# Patient Record
Sex: FEMALE | Employment: UNEMPLOYED | URBAN - METROPOLITAN AREA
[De-identification: names, ages, dates, MRNs, and addresses within clinical notes are randomized per-mention and may not be internally consistent; named-entity substitution may affect disease eponyms.]

---

## 2017-04-21 ENCOUNTER — HOSPITAL ENCOUNTER (OUTPATIENT)
Dept: GENERAL RADIOLOGY | Age: 52
Discharge: HOME | End: 2017-04-21
Payer: MEDICARE

## 2017-04-21 DIAGNOSIS — R05.9 COUGH: ICD-10-CM

## 2017-04-21 PROCEDURE — 71020 XR CHEST PA LAT: CPT

## 2017-05-19 ENCOUNTER — HOSPITAL ENCOUNTER (OUTPATIENT)
Dept: NON INVASIVE DIAGNOSTICS | Age: 52
Discharge: HOME | End: 2017-05-19
Attending: FAMILY MEDICINE
Payer: MEDICARE

## 2017-05-19 DIAGNOSIS — R60.9 EDEMA: ICD-10-CM

## 2017-05-19 PROCEDURE — 93306 TTE W/DOPPLER COMPLETE: CPT

## 2018-02-14 ENCOUNTER — HOSPITAL ENCOUNTER (INPATIENT)
Age: 53
LOS: 5 days | Discharge: HOME HEALTH | DRG: 206 | End: 2018-02-19
Attending: EMERGENCY MEDICINE | Admitting: FAMILY MEDICINE
Payer: MEDICARE

## 2018-02-14 ENCOUNTER — APPOINTMENT (OUTPATIENT)
Dept: GENERAL RADIOLOGY | Age: 53
DRG: 206 | End: 2018-02-14
Attending: EMERGENCY MEDICINE
Payer: MEDICARE

## 2018-02-14 ENCOUNTER — APPOINTMENT (OUTPATIENT)
Dept: CT IMAGING | Age: 53
DRG: 206 | End: 2018-02-14
Attending: FAMILY MEDICINE
Payer: MEDICARE

## 2018-02-14 DIAGNOSIS — R29.898 WEAKNESS OF BOTH LEGS: ICD-10-CM

## 2018-02-14 DIAGNOSIS — R06.02 SHORTNESS OF BREATH: Primary | ICD-10-CM

## 2018-02-14 DIAGNOSIS — Q75.0 CRANIOSTENOSIS: ICD-10-CM

## 2018-02-14 DIAGNOSIS — R60.9 PERIPHERAL EDEMA: ICD-10-CM

## 2018-02-14 LAB
ALBUMIN SERPL-MCNC: 3.5 G/DL (ref 3.4–5)
ALBUMIN SERPL-MCNC: 3.6 G/DL (ref 3.4–5)
ALBUMIN/GLOB SERPL: 1 {RATIO} (ref 0.8–1.7)
ALBUMIN/GLOB SERPL: 1.1 {RATIO} (ref 0.8–1.7)
ALP SERPL-CCNC: 138 U/L (ref 45–117)
ALP SERPL-CCNC: 138 U/L (ref 45–117)
ALT SERPL-CCNC: 38 U/L (ref 13–56)
ALT SERPL-CCNC: 39 U/L (ref 13–56)
ANION GAP SERPL CALC-SCNC: 8 MMOL/L (ref 3–18)
AST SERPL-CCNC: 24 U/L (ref 15–37)
AST SERPL-CCNC: 28 U/L (ref 15–37)
ATRIAL RATE: 87 BPM
BASOPHILS # BLD: 0 K/UL (ref 0–0.06)
BASOPHILS NFR BLD: 1 % (ref 0–2)
BILIRUB DIRECT SERPL-MCNC: 0.2 MG/DL (ref 0–0.2)
BILIRUB SERPL-MCNC: 0.5 MG/DL (ref 0.2–1)
BILIRUB SERPL-MCNC: 0.5 MG/DL (ref 0.2–1)
BNP SERPL-MCNC: 31 PG/ML (ref 0–900)
BUN SERPL-MCNC: 13 MG/DL (ref 7–18)
BUN/CREAT SERPL: 24 (ref 12–20)
CALCIUM SERPL-MCNC: 8.7 MG/DL (ref 8.5–10.1)
CALCULATED P AXIS, ECG09: 23 DEGREES
CALCULATED R AXIS, ECG10: 130 DEGREES
CALCULATED T AXIS, ECG11: 32 DEGREES
CHLORIDE SERPL-SCNC: 107 MMOL/L (ref 100–108)
CO2 SERPL-SCNC: 30 MMOL/L (ref 21–32)
CREAT SERPL-MCNC: 0.55 MG/DL (ref 0.6–1.3)
DIAGNOSIS, 93000: NORMAL
DIFFERENTIAL METHOD BLD: ABNORMAL
EOSINOPHIL # BLD: 0.2 K/UL (ref 0–0.4)
EOSINOPHIL NFR BLD: 5 % (ref 0–5)
ERYTHROCYTE [DISTWIDTH] IN BLOOD BY AUTOMATED COUNT: 14.1 % (ref 11.6–14.5)
GLOBULIN SER CALC-MCNC: 3.4 G/DL (ref 2–4)
GLOBULIN SER CALC-MCNC: 3.4 G/DL (ref 2–4)
GLUCOSE SERPL-MCNC: 93 MG/DL (ref 74–99)
HCT VFR BLD AUTO: 41.9 % (ref 35–45)
HGB BLD-MCNC: 13.7 G/DL (ref 12–16)
LYMPHOCYTES # BLD: 1.3 K/UL (ref 0.9–3.6)
LYMPHOCYTES NFR BLD: 31 % (ref 21–52)
MCH RBC QN AUTO: 27.9 PG (ref 24–34)
MCHC RBC AUTO-ENTMCNC: 32.7 G/DL (ref 31–37)
MCV RBC AUTO: 85.3 FL (ref 74–97)
MONOCYTES # BLD: 0.3 K/UL (ref 0.05–1.2)
MONOCYTES NFR BLD: 6 % (ref 3–10)
NEUTS SEG # BLD: 2.4 K/UL (ref 1.8–8)
NEUTS SEG NFR BLD: 57 % (ref 40–73)
P-R INTERVAL, ECG05: 142 MS
PLATELET # BLD AUTO: 190 K/UL (ref 135–420)
PMV BLD AUTO: 9.8 FL (ref 9.2–11.8)
POTASSIUM SERPL-SCNC: 3.9 MMOL/L (ref 3.5–5.5)
PROT SERPL-MCNC: 6.9 G/DL (ref 6.4–8.2)
PROT SERPL-MCNC: 7 G/DL (ref 6.4–8.2)
Q-T INTERVAL, ECG07: 378 MS
QRS DURATION, ECG06: 78 MS
QTC CALCULATION (BEZET), ECG08: 454 MS
RBC # BLD AUTO: 4.91 M/UL (ref 4.2–5.3)
SODIUM SERPL-SCNC: 145 MMOL/L (ref 136–145)
TSH SERPL DL<=0.05 MIU/L-ACNC: 0.75 UIU/ML (ref 0.36–3.74)
VENTRICULAR RATE, ECG03: 87 BPM
WBC # BLD AUTO: 4.2 K/UL (ref 4.6–13.2)

## 2018-02-14 PROCEDURE — 80076 HEPATIC FUNCTION PANEL: CPT | Performed by: EMERGENCY MEDICINE

## 2018-02-14 PROCEDURE — 65270000029 HC RM PRIVATE

## 2018-02-14 PROCEDURE — 85025 COMPLETE CBC W/AUTO DIFF WBC: CPT | Performed by: EMERGENCY MEDICINE

## 2018-02-14 PROCEDURE — 99284 EMERGENCY DEPT VISIT MOD MDM: CPT

## 2018-02-14 PROCEDURE — 71045 X-RAY EXAM CHEST 1 VIEW: CPT

## 2018-02-14 PROCEDURE — 93005 ELECTROCARDIOGRAM TRACING: CPT

## 2018-02-14 PROCEDURE — 83880 ASSAY OF NATRIURETIC PEPTIDE: CPT | Performed by: EMERGENCY MEDICINE

## 2018-02-14 PROCEDURE — 80053 COMPREHEN METABOLIC PANEL: CPT | Performed by: EMERGENCY MEDICINE

## 2018-02-14 PROCEDURE — 84443 ASSAY THYROID STIM HORMONE: CPT | Performed by: EMERGENCY MEDICINE

## 2018-02-14 PROCEDURE — 70450 CT HEAD/BRAIN W/O DYE: CPT

## 2018-02-14 NOTE — ED PROVIDER NOTES
EMERGENCY DEPARTMENT HISTORY AND PHYSICAL EXAM    11:42 AM      Date: 2/14/2018  Patient Name: Conrad Hollins    History of Presenting Illness     Chief Complaint   Patient presents with    Shortness of Breath         History Provided By: Caregiver    Chief Complaint: Shortness of breath  Duration:  Weeks  Timing:  Gradual and Worsening  Location: N/A  Quality: N/A  Severity: Moderate  Modifying Factors: Made worse at night. Associated Symptoms: cough      Additional History (Context): Conrad Hollins is a 50 y.o. female with a history of encephalocele and cerebral palsy s/p shunting, who presents to the ED via EMS with complaint of intermittent shortness of breath which has been ongoing for the past month. Patient's caregiver is the primary historian due to developmental delay. He notes that patient has been complaining of shortness of breath for \"a while now\" and has been seen by her PCP, Dr. Joslyn Hill. Patient has also had a cough, but has been unable to bring up any secretions. Caregiver denies any signs of fever, rhinorrhea, nausea, vomiting, diarrhea, or abdominal pain. He notes that she sometimes experiences constipation. Patient has no history of asthma or heart problems. She currently takes Lasix. Caregiver notes that Dr. Joslyn Hill referred patient to the ED this morning for further workup. History and ROS are limited due to developmental delay.     PCP: Ulises Dooley MD        Past History     Past Medical History:  Past Medical History:   Diagnosis Date    Cerebral palsy ([address]     Hematoma     left tibia    Osteoarthritis of right knee     Osteoporosis     diffuse    Pressure ulcer of heel     right    Scoliosis     thoracolumar    Sprain     right lower leg and ankle       Past Surgical History:  Past Surgical History:   Procedure Laterality Date    HX ORTHOPAEDIC      leg surgery x 2    HX OTHER SURGICAL      shunts inserted in left and right side of head       Family History:  Family History Problem Relation Age of Onset    Diabetes Other      NOS       Social History:  Social History   Substance Use Topics    Smoking status: Not on file    Smokeless tobacco: Not on file    Alcohol use No       Allergies: Allergies   Allergen Reactions    Penicillins Not Reported This Time         Review of Systems       Review of Systems   Unable to perform ROS: Other (Developmental delay. History provided by caregiver.)   Constitutional: Negative for activity change, appetite change, chills, diaphoresis, fatigue, fever and unexpected weight change. HENT: Negative for congestion, dental problem, drooling, ear discharge, ear pain, facial swelling, hearing loss, mouth sores, nosebleeds, postnasal drip, rhinorrhea, sinus pressure, sneezing, sore throat, tinnitus and trouble swallowing. Eyes: Negative for photophobia, pain, discharge, redness, itching and visual disturbance. Respiratory: Positive for cough and shortness of breath. Negative for apnea, choking, chest tightness, wheezing and stridor. Cardiovascular: Positive for leg swelling. Negative for chest pain and palpitations. Gastrointestinal: Negative for abdominal distention, abdominal pain, anal bleeding, blood in stool, constipation, diarrhea, nausea, rectal pain and vomiting. Endocrine: Negative for cold intolerance, heat intolerance, polydipsia, polyphagia and polyuria. Genitourinary: Negative for decreased urine volume, difficulty urinating, dysuria, enuresis, flank pain, frequency, genital sores, hematuria and urgency. Musculoskeletal: Negative for arthralgias, back pain, gait problem, joint swelling, myalgias, neck pain and neck stiffness. Skin: Negative for color change, pallor, rash and wound. Allergic/Immunologic: Negative for environmental allergies, food allergies and immunocompromised state.    Neurological: Negative for dizziness, tremors, seizures, syncope, facial asymmetry, speech difficulty, weakness, light-headedness, numbness and headaches. Hematological: Negative for adenopathy. Does not bruise/bleed easily. Psychiatric/Behavioral: Negative for agitation, behavioral problems, confusion, decreased concentration, dysphoric mood, hallucinations, self-injury, sleep disturbance and suicidal ideas. The patient is not nervous/anxious and is not hyperactive. Physical Exam     Visit Vitals    /78    Pulse (!) 103    Temp 97.9 °F (36.6 °C)    Resp 18    Ht 5' 4\" (1.626 m)    Wt 127.2 kg (280 lb 8 oz)    SpO2 95%    Breastfeeding No    BMI 48.15 kg/m2         Physical Exam   Constitutional: She appears well-developed and well-nourished. Alert and appropriate in no apparent marked discomfort or acute respiratory distress   HENT:   Head: Normocephalic and atraumatic. Right Ear: External ear normal.   Left Ear: External ear normal.   Mouth/Throat: Oropharynx is clear and moist. No oropharyngeal exudate. Eyes: Conjunctivae and EOM are normal. Pupils are equal, round, and reactive to light. Right eye exhibits no discharge. Left eye exhibits no discharge. No scleral icterus. Neck: Normal range of motion. No tracheal deviation present. No thyromegaly present. Cardiovascular: Normal rate, regular rhythm, normal heart sounds and intact distal pulses. Exam reveals no gallop and no friction rub. No murmur heard. Pulmonary/Chest: Effort normal and breath sounds normal. No respiratory distress. She has no wheezes. She has no rales. She exhibits no tenderness. Abdominal: Soft. Bowel sounds are normal. She exhibits no distension. There is no tenderness. There is no rebound and no guarding. Musculoskeletal: Normal range of motion. She exhibits edema. She exhibits no tenderness. Lymphadenopathy:     She has no cervical adenopathy. Neurological: She is alert. No cranial nerve deficit. Coordination normal.   Baseline developmental delay without new changes according to father   Skin: Skin is warm.  No erythema. Psychiatric: She has a normal mood and affect. Her behavior is normal. Judgment and thought content normal.   Nursing note and vitals reviewed. Diagnostic Study Results     Labs -  Recent Results (from the past 12 hour(s))   EKG, 12 LEAD, INITIAL    Collection Time: 02/14/18  1:36 PM   Result Value Ref Range    Ventricular Rate 87 BPM    Atrial Rate 87 BPM    P-R Interval 142 ms    QRS Duration 78 ms    Q-T Interval 378 ms    QTC Calculation (Bezet) 454 ms    Calculated P Axis 23 degrees    Calculated R Axis 130 degrees    Calculated T Axis 32 degrees    Diagnosis       Normal sinus rhythm  Possible Right ventricular hypertrophy  Nonspecific ST and T wave abnormality  Abnormal ECG  No previous ECGs available  Confirmed by Narayan Jeffries MD, --- ([id number]) on 2/14/2018 3:33:48 PM     CBC WITH AUTOMATED DIFF    Collection Time: 02/14/18  1:53 PM   Result Value Ref Range    WBC 4.2 (L) 4.6 - 13.2 K/uL    RBC 4.91 4.20 - 5.30 M/uL    HGB 13.7 12.0 - 16.0 g/dL    HCT 41.9 35.0 - 45.0 %    MCV 85.3 74.0 - 97.0 FL    MCH 27.9 24.0 - 34.0 PG    MCHC 32.7 31.0 - 37.0 g/dL    RDW 14.1 11.6 - 14.5 %    PLATELET 474 165 - 360 K/uL    MPV 9.8 9.2 - 11.8 FL    NEUTROPHILS 57 40 - 73 %    LYMPHOCYTES 31 21 - 52 %    MONOCYTES 6 3 - 10 %    EOSINOPHILS 5 0 - 5 %    BASOPHILS 1 0 - 2 %    ABS. NEUTROPHILS 2.4 1.8 - 8.0 K/UL    ABS. LYMPHOCYTES 1.3 0.9 - 3.6 K/UL    ABS. MONOCYTES 0.3 0.05 - 1.2 K/UL    ABS. EOSINOPHILS 0.2 0.0 - 0.4 K/UL    ABS.  BASOPHILS 0.0 0.0 - 0.06 K/UL    DF AUTOMATED     METABOLIC PANEL, COMPREHENSIVE    Collection Time: 02/14/18  3:47 PM   Result Value Ref Range    Sodium 145 136 - 145 mmol/L    Potassium 3.9 3.5 - 5.5 mmol/L    Chloride 107 100 - 108 mmol/L    CO2 30 21 - 32 mmol/L    Anion gap 8 3.0 - 18 mmol/L    Glucose 93 74 - 99 mg/dL    BUN 13 7.0 - 18 MG/DL    Creatinine 0.55 (L) 0.6 - 1.3 MG/DL    BUN/Creatinine ratio 24 (H) 12 - 20      GFR est AA >60 >60 ml/min/1.73m2    GFR est non-AA >60 >60 ml/min/1.73m2    Calcium 8.7 8.5 - 10.1 MG/DL    Bilirubin, total 0.5 0.2 - 1.0 MG/DL    ALT (SGPT) 39 13 - 56 U/L    AST (SGOT) 24 15 - 37 U/L    Alk. phosphatase 138 (H) 45 - 117 U/L    Protein, total 6.9 6.4 - 8.2 g/dL    Albumin 3.5 3.4 - 5.0 g/dL    Globulin 3.4 2.0 - 4.0 g/dL    A-G Ratio 1.0 0.8 - 1.7     NT-PRO BNP    Collection Time: 02/14/18  3:47 PM   Result Value Ref Range    NT pro-BNP 31 0 - 900 PG/ML       Radiologic Studies -   DUPLEX LOWER EXT VENOUS BILAT   Final Result      CT HEAD WO CONT   Final Result      XR CHEST PORT   Final Result      ER physician interpretation of Chest X-ray:    Loss of lung volume versus expiratory film. Increased interstitial markings. 12:28 PM       Medical Decision Making   I am the first provider for this patient. I reviewed the vital signs, available nursing notes, past medical history, past surgical history, family history and social history. Vital Signs-Reviewed the patient's vital signs. EKG: Interpreted by the EP. Time Interpreted: 1328   Rate: 87   Rhythm: Normal Sinus Rhythm t-wave flattening in anterior/septal leads,    Interpretation: Abnormal    Records Reviewed: Nursing Notes and Old Medical Records (Time of Review: 11:42 AM)    ED Course: Progress Notes, Reevaluation, and Consults:  Consult:  Discussed care with Dr. Brayan Jacobson. Standard discussion; including history of patients chief complaint, available diagnostic results, and treatment course. Will admit patient for further work-up of possible underlying structural heart disease or progression of neurologic disease in a patient no longer able to ambulate with SOB and peripheral edema. 5:48 PM, 2/14/2018      Provider Notes (Medical Decision Making): Patient with increasing shortness of breath and peripheral edema and associated weakness. Will evaluate for possible anemia, electrolyte abnormality, renal or hepatic failure and CHF.  Will likely require admission due to progression of illness with increasing weakness. For Hospitalized Patients:    Hospitalization Decision Time:  The decision to hospitalize the patient was made by Dr. Ghazal Ovalles at 5:48 PM on 2/14/2018    Diagnosis     Clinical Impression:   1. Shortness of breath    2. Weakness of both legs    3. Craniostenosis    4. Peripheral edema        Disposition: Admission    Follow-up Information     Follow up With Details Comments MD Stone On 2/26/2018 @12:00PM [address] [place] [address]  [phone]             Discharge Medication List as of 2/19/2018  3:44 PM      CONTINUE these medications which have NOT CHANGED    Details   potassium chloride (K-DUR, KLOR-CON) 20 mEq tablet Take 20 mEq by mouth daily. , Historical Med      furosemide (LASIX) 40 mg tablet Take 40 mg by mouth daily. , Historical Med      multivitamin (ONE A DAY) tablet Take 1 Tab by mouth daily. , Historical Med      calcium-vitamin D (OYSTER SHELL) 500 mg(1,250mg) -200 unit per tablet Take 1 Tab by mouth two (2) times daily (with meals). , Historical Med           _______________________________    Scribe Attestation:     Shirley Pollack, acting as a scribe for and in the presence of Damaris Ralph MD      February 14, 2018 at 11:42 AM       Provider Attestation:      I personally performed the services described in the documentation, reviewed the documentation, as recorded by the scribe in my presence, and it accurately and completely records my words and actions.  February 14, 2018 at 11:42 AM - Damaris Ralph MD      _______________________________

## 2018-02-14 NOTE — IP AVS SNAPSHOT
[address] Patient: Gwendolyn Malik MRN: [id number] :1965 A check idalmis indicates which time of day the medication should be taken. My Medications CONTINUE taking these medications Instructions Each Dose to Equal  
 Morning Noon Evening Bedtime  
 calcium-vitamin D 500 mg(1,250mg) -200 unit per tablet Commonly known as:  OYSTER SHELL Your last dose was: Your next dose is: Take 1 Tab by mouth two (2) times daily (with meals). 1 Tab  
    
   
   
   
  
 furosemide 40 mg tablet Commonly known as:  LASIX Your last dose was: Your next dose is: Take 40 mg by mouth daily. 40 mg  
    
   
   
   
  
 multivitamin tablet Commonly known as:  ONE A DAY Your last dose was: Your next dose is: Take 1 Tab by mouth daily. 1 Tab  
    
   
   
   
  
 potassium chloride 20 mEq tablet Commonly known as:  K-DUR, KLOR-CON Your last dose was: Your next dose is: Take 20 mEq by mouth daily. 20 mEq

## 2018-02-14 NOTE — IP AVS SNAPSHOT
[address] [facility] 
 
 
 [address] Patient: Genevieve Singh MRN: [id number] :1965 About your hospitalization You were admitted on:  2018 [facility] [address] You were discharged on:  2018 Why you were hospitalized Your primary diagnosis was:  Not on File Your diagnoses also included:  Shortness Of Breath, Weakness Of Both Legs, Craniostenosis, Peripheral Edema Follow-up Information Follow up With Details Comments Contact Info Coco Lowe MD On 2018 @12:00PM [address] [place], [place] [address]ti [facility] [address] [phone] Discharge Orders None A check idalmis indicates which time of day the medication should be taken. My Medications CONTINUE taking these medications Instructions Each Dose to Equal  
 Morning Noon Evening Bedtime  
 calcium-vitamin D 500 mg(1,250mg) -200 unit per tablet Commonly known as:  OYSTER SHELL Your last dose was: Your next dose is: Take 1 Tab by mouth two (2) times daily (with meals). 1 Tab  
    
   
   
   
  
 furosemide 40 mg tablet Commonly known as:  LASIX Your last dose was: Your next dose is: Take 40 mg by mouth daily. 40 mg  
    
   
   
   
  
 multivitamin tablet Commonly known as:  ONE A DAY Your last dose was: Your next dose is: Take 1 Tab by mouth daily. 1 Tab  
    
   
   
   
  
 potassium chloride 20 mEq tablet Commonly known as:  K-DUR, KLOR-CON Your last dose was: Your next dose is: Take 20 mEq by mouth daily. 20 mEq Discharge Instructions Learning About Craniosynostosis in Newborns What is craniosynostosis?  
 
Craniosynostosis (say \"ipzk-qcy-od-kii-cyml-AOV-corey\") is a problem with the skull. The soft areas between the plates of the baby's skull are called sutures. The sutures usually start to fuse together after a child is 3years of age. With craniosynostosis, one or more of the sutures fuses too soon. This can keep the skull from expanding as the baby grows. In severe cases, it can cause pressure on the brain. This is a congenital condition. This means your baby was born with it. If there's a lot of pressure on your baby's brain, surgery may be needed right away to relieve the pressure. Your child will be asleep during surgery. Your baby may need special care, such as being in the  intensive care unit (NICU). This may be scary for you. But the hospital staff understands this. They will explain what happens and will answer your questions. What can you expect? · You may see tubes and wires attached to your baby. This can be scary to see. But these things help the doctor treat your baby. The tubes supply air, fluid, and medicines to your baby. The wires are attached to machines that help the doctor keep track of your baby's vital signs. These include temperature, blood pressure, breathing rate, and pulse rate. · If your baby has trouble breathing, the doctor may use a ventilator. This machine helps your baby breathe. To do this, the doctor puts a soft tube through your baby's mouth into the windpipe. · The hospital staff will give your baby the nutrition he or she needs. The doctor may feed your baby through a soft tube that goes through the nose and into the stomach. Or the doctor may use an IV that goes through the belly button to do this. · Your baby will be kept comfortable and warm. · It may seem that your baby is getting lots of tests. All of these tests help your doctor keep track of your baby's condition and give the best treatment possible.  
· It's hard to be apart from your baby, especially when you worry about his or her condition. Know that the hospital staff is well prepared to care for babies with this condition. They will do everything they can to help. If you need it, get support from friends and family. Ask the hospital staff about counseling and support. Where can you learn more? Go to http://andrzej-anjali.info/. Enter D373 in the search box to learn more about \"Learning About Craniosynostosis in Newborns. \" Current as of: May 12, 2017 Content Version: 11.4 © 8923-5870 [employer]. Care instructions adapted under license by [employer] (which disclaims liability or warranty for this information). If you have questions about a medical condition or this instruction, always ask your healthcare professional. [address] any warranty or liability for your use of this information. Leg and Ankle Edema: Care Instructions Your Care Instructions Swelling in the legs, ankles, and feet is called edema. It is common after you sit or stand for a while. Long plane flights or car rides often cause swelling in the legs and feet. You may also have swelling if you have to stand for long periods of time at your job. Problems with the veins in the legs (varicose veins) and changes in hormones can also cause swelling. Sometimes the swelling in the ankles and feet is caused by a more serious problem, such as heart failure, infection, blood clots, or liver or kidney disease. Follow-up care is a key part of your treatment and safety. Be sure to make and go to all appointments, and call your doctor if you are having problems. It's also a good idea to know your test results and keep a list of the medicines you take. How can you care for yourself at home? · If your doctor gave you medicine, take it as prescribed. Call your doctor if you think you are having a problem with your medicine. · Whenever you are resting, raise your legs up.  Try to keep the swollen area higher than the level of your heart. · Take breaks from standing or sitting in one position. ¨ Walk around to increase the blood flow in your lower legs. ¨ Move your feet and ankles often while you stand, or tighten and relax your leg muscles. · Wear support stockings. Put them on in the morning, before swelling gets worse. · Eat a balanced diet. Lose weight if you need to. · Limit the amount of salt (sodium) in your diet. Salt holds fluid in the body and may increase swelling. When should you call for help? Call 911 anytime you think you may need emergency care. For example, call if: 
? · You have symptoms of a blood clot in your lung (called a pulmonary embolism). These may include: 
¨ Sudden chest pain. ¨ Trouble breathing. ¨ Coughing up blood. ?Call your doctor now or seek immediate medical care if: 
? · You have signs of a blood clot, such as: 
¨ Pain in your calf, back of the knee, thigh, or groin. ¨ Redness and swelling in your leg or groin. ? · You have symptoms of infection, such as: 
¨ Increased pain, swelling, warmth, or redness. ¨ Red streaks or pus. ¨ A fever. ? Watch closely for changes in your health, and be sure to contact your doctor if: 
? · Your swelling is getting worse. ? · You have new or worsening pain in your legs. ? · You do not get better as expected. Where can you learn more? Go to http://andrzej-anjali.info/. Enter W025 in the search box to learn more about \"Leg and Ankle Edema: Care Instructions. \" Current as of: March 20, 2017 Content Version: 11.4 © 0129-9191 [employer]. Care instructions adapted under license by [employer] (which disclaims liability or warranty for this information). If you have questions about a medical condition or this instruction, always ask your healthcare professional. [address] any warranty or liability for your use of this information. Unresulted Labs-Please follow up with your PCP about these lab tests Order Current Status DUPLEX LOWER EXT VENOUS BILAT Preliminary result Providers Seen During Your Hospitalization Provider Specialty Primary office phone Paulo Chew MD Emergency Medicine [phone] Mal King MD Family Practice [phone] Your Primary Care Physician (PCP) Primary Care Physician Office Phone Office Fax Heather Dove [phone] You are allergic to the following Allergen Reactions Penicillins Not Reported This Time Recent Documentation Height Weight Breastfeeding? BMI Smoking Status 1.626 m 127.2 kg No 48.15 kg/m2 Never Assessed Emergency Contacts Name Discharge Info Relation Home Work Mobile [facility] DISCHARGE CAREGIVER [3] Other Relative [6] [phone] Damián Dougherty DISCHARGE CAREGIVER [3] Brother [24] [phone] Patient Belongings The following personal items are in your possession at time of discharge: 
  Dental Appliances: None  Visual Aid: None      Home Medications: None   Jewelry: None  Clothing: Pants, Shirt, Tie, Undergarments, Socks, Footwear    Other Valuables: None Please provide this summary of care documentation to your next provider. Signatures-by signing, you are acknowledging that this After Visit Summary has been reviewed with you and you have received a copy. Patient Signature:  ____________________________________________________________ Date:  ____________________________________________________________  
  
Emi Curiel Provider Signature:  ____________________________________________________________ Date:  ____________________________________________________________

## 2018-02-14 NOTE — ED TRIAGE NOTES
Patient c/o sob on and off for a few weeks Patient's PCP wanted her to be evaluated for her breathing.  Patient currently have no complaints at this time

## 2018-02-14 NOTE — IP AVS SNAPSHOT
Summary of Care Report The Summary of Care report has been created to help improve care coordination. Users with access to [employer] or [address] (Web-based application) may access additional patient information including the Discharge Summary. If you are not currently a [facility] user and need more information, please call the number listed below in the [place] [address] section and ask to be connected with Medical Records. Facility Information Name Address Phone [address] [place] [address] [address] [facility] [address] [phone] Patient Information Patient Name Sex ARSENIO Birch ([id number]) Female 1965 Discharge Information Admitting Provider Service Area Unit Ranjit Gilbert MD / Bakari Pratt 71 / [phone] Discharge Provider Discharge Date/Time Discharge Disposition Destination (none) (none) (none) (none) Patient Language Language ENGLISH [13] Hospital Problems as of 2018  Reviewed: 2018  7:08 PM by Ranjit Gilbert MD  
  
  
  
 Class Noted - Resolved Last Modified POA Active Problems Shortness of breath  2018 - Present 2018 by Andrea Harris MD Unknown Entered by Andrea Harris MD  
  Weakness of both legs  2018 - Present 2018 by Ranjit Gilbert MD Unknown Entered by Ranjit Gilbert MD  
  Craniostenosis  2018 - Present 2018 by Ranjit Gilbert MD Unknown Entered by Ranjit Gilbert MD  
  Peripheral edema  2018 - Present 2018 by Ranjit Gilbert MD Unknown Entered by Ranjit Gilbert MD  
  
Non-Hospital Problems as of 2018  Reviewed: 2018  7:08 PM by Ranjit Gilbert MD  
  
  
  
 Class Noted - Resolved Last Modified Active Problems Follow-up exam, less than 3 months since previous exam, DEXA scan  Unknown - Present 2/22/2011 by Dionisio Kruger Entered by Dionisio Kruger You are allergic to the following Allergen Reactions Penicillins Not Reported This Time Current Discharge Medication List  
  
ASK your doctor about these medications Dose & Instructions Dispensing Information Comments  
 calcium-vitamin D 500 mg(1,250mg) -200 unit per tablet Commonly known as:  OYSTER SHELL Dose:  1 Tab Take 1 Tab by mouth two (2) times daily (with meals). Refills:  0  
   
 furosemide 40 mg tablet Commonly known as:  LASIX Dose:  40 mg Take 40 mg by mouth daily. Refills:  0  
   
 multivitamin tablet Commonly known as:  ONE A DAY Dose:  1 Tab Take 1 Tab by mouth daily. Refills:  0  
   
 potassium chloride 20 mEq tablet Commonly known as:  K-DUR, KLOR-CON Dose:  20 mEq Take 20 mEq by mouth daily. Refills:  0 Follow-up Information Follow up With Details Comments Contact Info Ulises Dooley MD   [address] [place], [place] [address]ti [facility] [address] 
[phone] Discharge Instructions Learning About Craniosynostosis in Newborns What is craniosynostosis? Craniosynostosis (say \"jhak-tdo-ca-hrv-yosf-NUP-corey\") is a problem with the skull. The soft areas between the plates of the baby's skull are called sutures. The sutures usually start to fuse together after a child is 3years of age. With craniosynostosis, one or more of the sutures fuses too soon. This can keep the skull from expanding as the baby grows. In severe cases, it can cause pressure on the brain. This is a congenital condition. This means your baby was born with it. If there's a lot of pressure on your baby's brain, surgery may be needed right away to relieve the pressure. Your child will be asleep during surgery. Your baby may need special care, such as being in the  intensive care unit (NICU). This may be scary for you. But the hospital staff understands this. They will explain what happens and will answer your questions. What can you expect? · You may see tubes and wires attached to your baby. This can be scary to see. But these things help the doctor treat your baby. The tubes supply air, fluid, and medicines to your baby. The wires are attached to machines that help the doctor keep track of your baby's vital signs. These include temperature, blood pressure, breathing rate, and pulse rate. · If your baby has trouble breathing, the doctor may use a ventilator. This machine helps your baby breathe. To do this, the doctor puts a soft tube through your baby's mouth into the windpipe. · The hospital staff will give your baby the nutrition he or she needs. The doctor may feed your baby through a soft tube that goes through the nose and into the stomach. Or the doctor may use an IV that goes through the belly button to do this. · Your baby will be kept comfortable and warm. · It may seem that your baby is getting lots of tests. All of these tests help your doctor keep track of your baby's condition and give the best treatment possible. · It's hard to be apart from your baby, especially when you worry about his or her condition. Know that the hospital staff is well prepared to care for babies with this condition. They will do everything they can to help. If you need it, get support from friends and family. Ask the hospital staff about counseling and support. Where can you learn more? Go to http://andrzej-anjali.info/. Enter X347 in the search box to learn more about \"Learning About Craniosynostosis in Newborns. \" Current as of: May 12, 2017 Content Version: 11.4 © 4946-7576 Healthwise, Incorporated.  Care instructions adapted under license by [address] (which disclaims liability or warranty for this information). If you have questions about a medical condition or this instruction, always ask your healthcare professional. [address] any warranty or liability for your use of this information. Leg and Ankle Edema: Care Instructions Your Care Instructions Swelling in the legs, ankles, and feet is called edema. It is common after you sit or stand for a while. Long plane flights or car rides often cause swelling in the legs and feet. You may also have swelling if you have to stand for long periods of time at your job. Problems with the veins in the legs (varicose veins) and changes in hormones can also cause swelling. Sometimes the swelling in the ankles and feet is caused by a more serious problem, such as heart failure, infection, blood clots, or liver or kidney disease. Follow-up care is a key part of your treatment and safety. Be sure to make and go to all appointments, and call your doctor if you are having problems. It's also a good idea to know your test results and keep a list of the medicines you take. How can you care for yourself at home? · If your doctor gave you medicine, take it as prescribed. Call your doctor if you think you are having a problem with your medicine. · Whenever you are resting, raise your legs up. Try to keep the swollen area higher than the level of your heart. · Take breaks from standing or sitting in one position. ¨ Walk around to increase the blood flow in your lower legs. ¨ Move your feet and ankles often while you stand, or tighten and relax your leg muscles. · Wear support stockings. Put them on in the morning, before swelling gets worse. · Eat a balanced diet. Lose weight if you need to. · Limit the amount of salt (sodium) in your diet. Salt holds fluid in the body and may increase swelling. When should you call for help? Call 911 anytime you think you may need emergency care. For example, call if: 
? · You have symptoms of a blood clot in your lung (called a pulmonary embolism). These may include: 
¨ Sudden chest pain. ¨ Trouble breathing. ¨ Coughing up blood. ?Call your doctor now or seek immediate medical care if: 
? · You have signs of a blood clot, such as: 
¨ Pain in your calf, back of the knee, thigh, or groin. ¨ Redness and swelling in your leg or groin. ? · You have symptoms of infection, such as: 
¨ Increased pain, swelling, warmth, or redness. ¨ Red streaks or pus. ¨ A fever. ? Watch closely for changes in your health, and be sure to contact your doctor if: 
? · Your swelling is getting worse. ? · You have new or worsening pain in your legs. ? · You do not get better as expected. Where can you learn more? Go to http://andrzej-anjali.info/. Enter K735 in the search box to learn more about \"Leg and Ankle Edema: Care Instructions. \" Current as of: March 20, 2017 Content Version: 11.4 © 6136-0508 [employer] ?. Care instructions adapted under license by [employer] (which disclaims liability or warranty for this information). If you have questions about a medical condition or this instruction, always ask your healthcare professional. [place] [address] any warranty or liability for your use of this information. Chart Review Routing History No Routing History on File

## 2018-02-15 LAB — GLUCOSE BLD STRIP.AUTO-MCNC: 92 MG/DL (ref 70–110)

## 2018-02-15 PROCEDURE — 82962 GLUCOSE BLOOD TEST: CPT

## 2018-02-15 PROCEDURE — 93306 TTE W/DOPPLER COMPLETE: CPT

## 2018-02-15 PROCEDURE — 65270000029 HC RM PRIVATE

## 2018-02-15 RX ORDER — BISMUTH SUBSALICYLATE 262 MG
1 TABLET,CHEWABLE ORAL DAILY
COMMUNITY

## 2018-02-15 RX ORDER — POTASSIUM CHLORIDE 20 MEQ/1
20 TABLET, EXTENDED RELEASE ORAL DAILY
COMMUNITY

## 2018-02-15 RX ORDER — FERROUS SULFATE, DRIED 160(50) MG
1 TABLET, EXTENDED RELEASE ORAL 2 TIMES DAILY WITH MEALS
COMMUNITY

## 2018-02-15 RX ORDER — FUROSEMIDE 40 MG/1
40 TABLET ORAL DAILY
COMMUNITY

## 2018-02-15 NOTE — ED NOTES
Patient resting well call bell within reach. No additional wants or needs noted patient watching TV at this  Time and Family at the bedside.

## 2018-02-15 NOTE — PROGRESS NOTES
Care Management Interventions  PCP Verified by CM: Yes (DR. Husam Silva)  Palliative Care Criteria Met (RRAT>21 & CHF Dx)?: No  Mode of Transport at Discharge: BLS  Transition of Care Consult (CM Consult): Discharge Planning ([address] [facility] [address])  Current Support Network: Relative's Home, Family Lives Nearby, Has Personal Caregivers ([address])  Confirm Follow Up Transport: Other (see comment)  Plan discussed with Pt/Family/Caregiver: Yes (PT'S FATHER IS REQUESTING LONG TERM PLACEMENT, FATHER LATONYA CANNONTSJVL-[phone], DOES NOT FEEL HE CAN TAKE CARE OF PT AT [address] [facility])  Discharge Location  Discharge Placement: [address] (LTAC)  This writer will speak with family regarding long term care facility for this pt instead of bringing this pt home. Pt's family will be provided the listing of facilities for their request for long term care placement.

## 2018-02-15 NOTE — H&P
History and Physical    Patient: Karen Shen MRN: [id number]  SSN: [id number]    YOB: 1965  Age: 50 y.o. Sex: female      Subjective:      Karen Shen is a 50 y.o. female who has craniostenosis but has been having progressive weakness so no longer ambulating. Patient has been having progressive edema and now with SOB. Patient poor historian. CXR hypoinflated. Will admit for evaluation of edema with SOB and weakness. Past Medical History:   Diagnosis Date    Cerebral palsy ([address]     Hematoma     left tibia    Osteoarthritis of right knee     Osteoporosis     diffuse    Pressure ulcer of heel     right    Scoliosis     thoracolumar    Sprain     right lower leg and ankle     Past Surgical History:   Procedure Laterality Date    HX ORTHOPAEDIC      leg surgery x 2    HX OTHER SURGICAL      shunts inserted in left and right side of head      Family History   Problem Relation Age of Onset    Diabetes Other      NOS     Social History   Substance Use Topics    Smoking status: Not on file    Smokeless tobacco: Not on file    Alcohol use No      Prior to Admission medications    Not on File        Allergies   Allergen Reactions    Penicillins Not Reported This Time       Review of Systems:  Review of systems not obtained due to patient factors. Objective:     Vitals:    02/14/18 1210 02/14/18 1326   BP:  (!) 145/100   Pulse: 89    Resp: 18    Temp: 97.6 °F (36.4 °C)    SpO2: 92%         Physical Exam:  GENERAL: alert, cooperative, no distress, slowed mentation, appears stated age  LUNG: clear to auscultation bilaterally  HEART: regular rate and rhythm, S1, S2 normal, no murmur, click, rub or gallop  ABDOMEN: soft, non-tender.  Bowel sounds normal. No masses,  no organomegaly  EXTREMITIES:  edema 2+  NEUROLOGIC: positive findings: muscular weakness both legs   Nystagmus horizontal    Assessment:     Hospital Problems  Date Reviewed: 2/14/2018          Codes Class Noted POA Shortness of breath ICD-10-CM: R06.02  ICD-9-CM: 786.05  2/14/2018 Unknown        Weakness of both legs ICD-10-CM: R29.898  ICD-9-CM: 729.89  2/14/2018 Unknown        Craniostenosis ICD-10-CM: Q75.0  ICD-9-CM: 756.0  2/14/2018 Unknown        Peripheral edema ICD-10-CM: R60.9  ICD-9-CM: 782.3  2/14/2018 Unknown              Plan:     Check ECHO head CT. Further workup as needed.     Signed By: Ulises Dooley MD     February 14, 2018

## 2018-02-15 NOTE — ED NOTES
Patient provided with an update on care call bell within reach. Patient denies pain.  No additional wants or needs noted at this time,

## 2018-02-15 NOTE — ROUTINE PROCESS
TRANSFER - IN REPORT:    Verbal report received from 1740 Shanna Steward RN(name) on Christian Alvarado  being received from ED(unit) for routine progression of care      Report consisted of patients Situation, Background, Assessment and   Recommendations(SBAR). Information from the following report(s) SBAR, Kardex, ED Summary and Recent Results was reviewed with the receiving nurse. Opportunity for questions and clarification was provided. Assessment completed upon patients arrival to unit and care assumed.

## 2018-02-15 NOTE — ED NOTES
Blood drawn and sent to the lab. IV in place and is flowing well. Patient denies pain and shortness of breath.  Family present at the bedside at this time

## 2018-02-15 NOTE — ROUTINE PROCESS
Bedside and Verbal shift change report given to Sallie Chow (oncoming nurse) by Nas Gardner RN (offgoing nurse). Report included the following information SBAR, Kardex, MAR and Recent Results.     SITUATION:  Code Status: Full Code  Reason for Admission: Shortness of breath  Hospital day: 1  Problem List:       Hospital Problems  Date Reviewed: 2/14/2018          Codes Class Noted POA    Shortness of breath ICD-10-CM: R06.02  ICD-9-CM: 786.05  2/14/2018 Unknown        Weakness of both legs ICD-10-CM: R29.898  ICD-9-CM: 729.89  2/14/2018 Unknown        Craniostenosis ICD-10-CM: Q75.0  ICD-9-CM: 756.0  2/14/2018 Unknown        Peripheral edema ICD-10-CM: R60.9  ICD-9-CM: 782.3  2/14/2018 Unknown              BACKGROUND:   Past Medical History:   Past Medical History:   Diagnosis Date    Cerebral palsy ([address]     Hematoma     left tibia    Osteoarthritis of right knee     Osteoporosis     diffuse    Pressure ulcer of heel     right    Scoliosis     thoracolumar    Sprain     right lower leg and ankle      Patient taking anticoagulants no    Patient has a defibrillator: no    History of shots YES for example, flu, pneumonia, tetanus   Isolation History NO for example, MRSA, CDiff    ASSESSMENT:  Changes in Assessment Throughout Shift: none  Significant Changes in 24 hours (for example, RR/code, fall)  Patient has Central Line: no   Patient has Klein Cath: no   Mobility Issues  PT  IV Patency  OR Checklist  Pending Tests    Last Vitals:  Vitals w/ MEWS Score (last day)     Date/Time MEWS Score Pulse Resp Temp BP Level of Consciousness SpO2    02/15/18 0424 2 86 18 97.3 °F (36.3 °C) 100/72 Alert 96 %    02/15/18 0110 1 78 18 97 °F (36.1 °C) 116/86 Alert 97 %    02/14/18 2100 1 95 18 97.4 °F (36.3 °C) 124/88 Alert 94 %    02/14/18 1937 1 87 18 98 °F (36.7 °C) 129/81 Alert 95 %    02/14/18 1326 -- -- -- -- (!)  145/100 -- --    02/14/18 12:10:33 -- 89 18 97.6 °F (36.4 °C) -- Alert 92 %            PAIN    Pain Assessment    Pain Intensity 1: 0 (02/15/18 0415)              Patient Stated Pain Goal: 0  Intervention effective: yes  Time of last intervention: Denied pain Reassessment Completed: yes   Other actions taken for pain: Distraction    Last 3 Weights: There were no vitals filed for this visit. Weight change:     INTAKE/OUPUT    Current Shift:      Last three shifts:      RECOMMENDATIONS AND DISCHARGE PLANNING  Patient needs and requests: Comfort and safety    Pending tests/procedures: labs     Discharge plan for patient: Home with [facility]    Discharge planning Needs or Barriers: none    Estimated Discharge Date: 2/16/2018 Posted on Whiteboard in Patients Room: yes       \"HEALS\" SAFETY CHECK  A safety check occurred in the patient's room between off going nurse and oncoming nurse listed above. The safety check included the below items:    H  High Alert Medications Verify all high alert medication drips (heparin, PCA, etc.)  E  Equipment Suction is set up for ALL patients (with lalito)  Red plugs utilized for all equipment (IV pumps, etc.)  WOWs wiped down at end of shift. Room stocked with oxygen, suction, and other unit-specific supplies  A  Alarms Bed alarm is set for fall risk patients  Ensure chair alarm is in place and activated if patient is up in a chair  L  Lines Check IV for any infiltration  Klein bag is empty if patient has a Klein   Tubing and IV bags are labeled  S  Safety  Room is clean, patient is clean, and equipment is clean. Hallways are clear from equipment besides carts. Fall bracelet on for fall risk patients  Ensure room is clear and free of clutter  Suction is set up for ALL patients (with lalito)  Hallways are clear from equipment besides carts.    Isolation precautions followed, supplies available outside room, sign posted    Dhruv Haile RN

## 2018-02-15 NOTE — PHYSICIAN ADVISORY
Letter of admission status determination     Terence Arnold   Age: 50 y.o. MRN: [id number]  [facility]:  [id number]    Date of admission: 2/14/2018    I have reviewed this case as it involves a Medicare patient admitted as inpatient that does not meet the medical necessity requirements in CMS regulation Section 43 .3 to support an inpatient level of care. Patient's condition and the documented plan of care at the time of presentation, with stable labs, vitals, no need for supplemental oxygen or IV therapies, do not support the need for medically necessary hospitalization that spans at least two midnights. Therefore, unless medically necessary hospitalization for a second midnight is anticipated, I recommend that this patient be downgraded to OBSERVATION status. This may change due to the medical condition of the patient and new clinical evidence as the patients care progreses. The final decision regarding the patient's hospitalization status depends on the attending physician's judgment.       Hector Johnson MD, RODNEY, [address] [facility]  Physician Paulo Verduzco.  [phone]    February 15, 2018   2:14 PM

## 2018-02-15 NOTE — ED NOTES
TRANSFER - OUT REPORT:    Verbal report given to Lucie Guzman (name) on Arielle Manger  being transferred to [phone] (unit) for routine progression of care       Report consisted of patients Situation, Background, Assessment and   Recommendations(SBAR). Information from the following report(s) ED Summary, MAR and Recent Results was reviewed with the receiving nurse. Lines:   Peripheral IV 02/14/18 Right (Active)   Site Assessment Clean, dry, & intact 2/14/2018  3:53 PM   Phlebitis Assessment 0 2/14/2018  3:53 PM   Infiltration Assessment 0 2/14/2018  3:53 PM   Dressing Status New 2/14/2018  3:53 PM   Hub Color/Line Status Pink 2/14/2018  3:53 PM        Opportunity for questions and clarification was provided.       Patient transported with:   Transport

## 2018-02-15 NOTE — PROGRESS NOTES
conducted an initial consultation and Spiritual Assessment for Margaux Slater, who is a 50 y.o.,female. Patients Primary Language is: [place]. According to the patients EMR Sabianist Affiliation is: [facility].     The reason the Patient came to the hospital is:   Patient Active Problem List    Diagnosis Date Noted    Shortness of breath 02/14/2018    Weakness of both legs 02/14/2018    Craniostenosis 02/14/2018    Peripheral edema 02/14/2018    Follow-up exam, less than 3 months since previous exam, DEXA scan         The  provided the following Interventions:  Initiated a relationship of care and support. Patient said she is doing all right. Her father was visiting. She said she uses her cell phone to access Zoroastrian material.   Explored issues of corky, spirituality and/or Zoroastrian needs while hospitalized. Listened empathically. Provided chaplaincy education. Provided information about Spiritual Care Services. Offered assurance of continued prayers on patient's behalf. Chart reviewed. The following outcomes were achieved:  Patient expressed gratitude for the 's visit. Assessment:  Patient did not indicate any spiritual or Zoroastrian issues which require Spiritual Care Services interventions at this time. Patient does not have any Zoroastrian/cultural needs that will affect patients preferences in health care. Plan:  Chaplains will continue to follow and will provide pastoral care on an as needed or requested basis.  recommends bedside caregivers page  on duty if patient shows signs of acute spiritual or emotional distress. Aba Olsen MDiv.   Board Certified [employer] [phone]

## 2018-02-15 NOTE — INTERDISCIPLINARY ROUNDS
Interdisciplinary Round Note   Patient Information:   Missael Giang   348/72   Reason for Admission: Shortness of breath   Attending Provider:   Claudia Beck MD  Primary Care Physician:       Claudia Bcek MD       [phone]   Estimated discharge date:  2/16/2015   Hospital day: 1  [unfilled]  - - -  RRAT Score: Low Risk            12       Total Score        3 Has Seen PCP in Last 6 Months (Yes=3, No=0)    9 Pt. Coverage (Medicare=5 , Medicaid, or Self-Pay=4)        Criteria that do not apply:    . Living with Significant Other. Assisted Living. LTAC. SNF. or   Rehab    Patient Length of Stay (>5 days = 3)    IP Visits Last 12 Months (1-3=4, 4=9, >4=11)    Charlson Comorbidity Score (Age + Comorbid Conditions)            No         Mechanical      Lines, Drains, & Airways  Peripheral IV line       IV Antibiotics:    Current Antimicrobial Therapy     None        GI Prophylaxis: GI Prophylaxis: no   Type:       Recent Glucose Results:   Lab Results   Component Value Date/Time    GLU 93 02/14/2018 03:47 PM      Activity Level:   Activity Level: Bed Rest    Needs assistance with ADLs: no       Goals for Today:    Recommendations:   Discharge Disposition: Home with home health PT  [facility] Management involvement for home health follow up for:  assistance with ADL's    Needs for Discharge:  IDR Team:   Recommendations from IDR team:     Other Notes:

## 2018-02-16 PROCEDURE — 97535 SELF CARE MNGMENT TRAINING: CPT

## 2018-02-16 PROCEDURE — 97166 OT EVAL MOD COMPLEX 45 MIN: CPT

## 2018-02-16 PROCEDURE — 77030011256 HC DRSG MEPILEX <16IN NO BORD MOLN -A

## 2018-02-16 PROCEDURE — 65270000029 HC RM PRIVATE

## 2018-02-16 PROCEDURE — 97530 THERAPEUTIC ACTIVITIES: CPT

## 2018-02-16 PROCEDURE — 97161 PT EVAL LOW COMPLEX 20 MIN: CPT

## 2018-02-16 PROCEDURE — 74011250637 HC RX REV CODE- 250/637: Performed by: FAMILY MEDICINE

## 2018-02-16 RX ORDER — THERA TABS 400 MCG
1 TAB ORAL DAILY
Status: DISCONTINUED | OUTPATIENT
Start: 2018-02-16 | End: 2018-02-19 | Stop reason: HOSPADM

## 2018-02-16 RX ORDER — INFANT FORMULA WITH IRON
POWDER (GRAM) ORAL 2 TIMES DAILY
Status: DISCONTINUED | OUTPATIENT
Start: 2018-02-16 | End: 2018-02-19 | Stop reason: HOSPADM

## 2018-02-16 RX ORDER — FERROUS SULFATE, DRIED 160(50) MG
1 TABLET, EXTENDED RELEASE ORAL
Status: DISCONTINUED | OUTPATIENT
Start: 2018-02-16 | End: 2018-02-19 | Stop reason: HOSPADM

## 2018-02-16 RX ORDER — POTASSIUM CHLORIDE 750 MG/1
10 TABLET, EXTENDED RELEASE ORAL DAILY
Status: DISCONTINUED | OUTPATIENT
Start: 2018-02-16 | End: 2018-02-19 | Stop reason: HOSPADM

## 2018-02-16 RX ORDER — FUROSEMIDE 20 MG/1
20 TABLET ORAL DAILY
Status: DISCONTINUED | OUTPATIENT
Start: 2018-02-16 | End: 2018-02-19 | Stop reason: HOSPADM

## 2018-02-16 RX ADMIN — THERA TABS 1 TABLET: TAB at 11:16

## 2018-02-16 RX ADMIN — POTASSIUM CHLORIDE 10 MEQ: 10 TABLET, EXTENDED RELEASE ORAL at 11:16

## 2018-02-16 RX ADMIN — FUROSEMIDE 20 MG: 20 TABLET ORAL at 11:16

## 2018-02-16 RX ADMIN — VITAMIN A AND VITAMIN D: 929.3 OINTMENT TOPICAL at 16:18

## 2018-02-16 RX ADMIN — VITAMIN A AND VITAMIN D: 929.3 OINTMENT TOPICAL at 11:17

## 2018-02-16 RX ADMIN — OYSTER SHELL CALCIUM WITH VITAMIN D 1 TABLET: 500; 200 TABLET, FILM COATED ORAL at 11:16

## 2018-02-16 NOTE — ROUTINE PROCESS
Bedside and Verbal shift change report given to Cammy Conner RN (oncoming nurse) by Indigo Lombardi RN (offgoing nurse). Report included the following information SBAR, Kardex, MAR and Recent Results.     SITUATION:  Code Status: Full Code  Reason for Admission: Shortness of breath  Hospital day: 2  Problem List:       Hospital Problems  Date Reviewed: 2/14/2018          Codes Class Noted POA    Shortness of breath ICD-10-CM: R06.02  ICD-9-CM: 786.05  2/14/2018 Unknown        Weakness of both legs ICD-10-CM: R29.898  ICD-9-CM: 729.89  2/14/2018 Unknown        Craniostenosis ICD-10-CM: Q75.0  ICD-9-CM: 756.0  2/14/2018 Unknown        Peripheral edema ICD-10-CM: R60.9  ICD-9-CM: 782.3  2/14/2018 Unknown              BACKGROUND:   Past Medical History:   Past Medical History:   Diagnosis Date    Cerebral palsy ([address]     Hematoma     left tibia    Osteoarthritis of right knee     Osteoporosis     diffuse    Pressure ulcer of heel     right    Scoliosis     thoracolumar    Sprain     right lower leg and ankle      Patient taking anticoagulants no    Patient has a defibrillator: no    History of shots YES for example, flu, pneumonia, tetanus   Isolation History NO for example, MRSA, CDiff    ASSESSMENT:  Changes in Assessment Throughout Shift: none  Significant Changes in 24 hours (for example, RR/code, fall)  Patient has Central Line: no   Patient has Klein Cath: no   Mobility Issues  PT  IV Patency  OR Checklist  Pending Tests    Last Vitals:  Vitals w/ MEWS Score (last day)     Date/Time MEWS Score Pulse Resp Temp BP Level of Consciousness SpO2    02/16/18 0356 1 86 18 97.5 °F (36.4 °C) 114/79 Alert 96 %    02/16/18 0025 1 89 20 98.6 °F (37 °C) 117/76 Alert 96 %    02/15/18 2020 1 88 20 97.2 °F (36.2 °C) 117/77 Alert 93 %    02/15/18 1628 1 92 20 97.7 °F (36.5 °C) 126/82 Alert 94 %    02/15/18 1132 1 79 20 97.5 °F (36.4 °C) 103/67 Alert 95 %    02/15/18 0726 1 89 18 98.1 °F (36.7 °C) 129/80 Alert --    02/15/18 0424 2 86 18 97.3 °F (36.3 °C) 100/72 Alert 96 %    02/15/18 0110 1 78 18 97 °F (36.1 °C) 116/86 Alert 97 %            PAIN    Pain Assessment    Pain Intensity 1: 0 (02/16/18 0356)              Patient Stated Pain Goal: 0  Intervention effective: yes  Time of last intervention: Denied pain Reassessment Completed: yes   Other actions taken for pain: Distraction    Last 3 Weights: There were no vitals filed for this visit. Weight change:     INTAKE/OUPUT    Current Shift:      Last three shifts: 02/14 1901 - 02/16 0700  In: 1100 [P.O.:1100]  Out: 500 [Urine:500]    RECOMMENDATIONS AND DISCHARGE PLANNING  Patient needs and requests: Comfort and safety    Pending tests/procedures: labs     Discharge plan for patient: Home with [facility]    Discharge planning Needs or Barriers: none    Estimated Discharge Date: 2/16/2018 Posted on Whiteboard in Patients Room: yes       \"HEALS\" SAFETY CHECK  A safety check occurred in the patient's room between off going nurse and oncoming nurse listed above. The safety check included the below items:    H  High Alert Medications Verify all high alert medication drips (heparin, PCA, etc.)  E  Equipment Suction is set up for ALL patients (with lalito)  Red plugs utilized for all equipment (IV pumps, etc.)  WOWs wiped down at end of shift. Room stocked with oxygen, suction, and other unit-specific supplies  A  Alarms Bed alarm is set for fall risk patients  Ensure chair alarm is in place and activated if patient is up in a chair  L  Lines Check IV for any infiltration  Klein bag is empty if patient has a Klein   Tubing and IV bags are labeled  S  Safety  Room is clean, patient is clean, and equipment is clean. Hallways are clear from equipment besides carts. Fall bracelet on for fall risk patients  Ensure room is clear and free of clutter  Suction is set up for ALL patients (with lalito)  Hallways are clear from equipment besides carts.    Isolation precautions followed, supplies available outside room, sign posted    Indigo Lombardi RN

## 2018-02-16 NOTE — PROGRESS NOTES
Problem: Mobility Impaired (Adult and Pediatric)  Goal: *Acute Goals and Plan of Care (Insert Text)  Physical Therapy Goals  Initiated 2/16/2018 and to be accomplished within 5 day(s)  1. Patient will move from supine to sit and sit to supine , scoot up and down and roll side to side in bed with moderate assistance . 2.  Patient will improve sitting balance to fair+ while performing LE exercises. 3.  Patient /family training on HEP for LE, positioning      physical Therapy EVALUATION    Patient: Stephy Smart (19 y.o. female)  Date: 2/16/2018  Primary Diagnosis: Shortness of breath        Precautions:   Fall, Other (comment) (skin; )    ASSESSMENT :  Based on the objective data described below, the patient presents with decreased functional mobility, transfers, decreased strength and balance. Pt/ family report that the pt. Has been in bed primarily for over a month as they are afraid of having her stand due to recent weight gain and inability to maintain WB through her LE's. Pt. Was able to transfer with assist to bed side commode prior as well as in to [address]. Pt. Is willing to work with PT to improve her mobility. Pt. Would benefit from SNF at discharge to enable a safe d/c plan and maximize pt's current functional level to establish a safe and effective method for transfers OOB to [address] to ease caregiver burden at home. Pt. Was able to sit at EOB w/ UE supported and assists with supine to sit. Pt. At risk for further decline and contractures should she remain at bed level at discharge. Pt's sister is receptive to establishing a plan for transfers and mobility. Pt. Has caregivers 2/day 9-2, 5p-8p and on weekends. Patient will benefit from skilled intervention to address the above impairments.   Patients rehabilitation potential is considered to be Good  Factors which may influence rehabilitation potential include:   []         None noted  []         Mental ability/status  []         Medical condition  [x] Home/family situation and support systems  []         Safety awareness  []         Pain tolerance/management  []         Other:      PLAN :  Recommendations and Planned Interventions:  [x]           Bed Mobility Training             [x]    Neuromuscular Re-Education  [x]           Transfer Training                   []    Orthotic/Prosthetic Training  []           Gait Training                          []    Modalities  [x]           Therapeutic Exercises          []    Edema Management/Control  [x]           Therapeutic Activities            [x]    Patient and Family Training/Education  []           Other (comment):    Frequency/Duration: Patient will be followed by physical therapy 1-2 times per day/4-7 days per week to address goals.   Discharge Recommendations: [facility]  Further Equipment Recommendations for Discharge: hospital bed, mechanical lift, WC     SUBJECTIVE:   Patient stated -.    OBJECTIVE DATA SUMMARY:     Past Medical History:   Diagnosis Date    [address] [address]     Hematoma     left tibia    Osteoarthritis of right knee     Osteoporosis     diffuse    Pressure ulcer of heel     right    Scoliosis     thoracolumar    Sprain     right lower leg and ankle     Past Surgical History:   Procedure Laterality Date    HX ORTHOPAEDIC      leg surgery x 2    HX OTHER SURGICAL      shunts inserted in left and right side of head     Barriers to Learning/Limitations: None  Compensate with: visual, verbal, tactile, kinesthetic cues/model    Eval Complexity: History: MEDIUM  Complexity : 1-2 comorbidities / personal factors will impact the outcome/ POC Exam:LOW Complexity : 1-2 Standardized tests and measures addressing body structure, function, activity limitation and / or participation in recreation  Presentation: LOW Complexity : Stable, uncomplicated  Clinical Decision Making:Low Complexity based on functional level Overall Complexity:LOW     GCODES(GP):  Mobility  Current  CM= 80-99%   Goal  CL= 60-79%. The severity rating is based on the Other on functional level and ADL score    Prior Level of Function/Home Situation: pt. Lives with her father, assisted by sisters and Homecare staff. Bed bound at this time due to progressive weakness  Home Situation  Home Environment: Apartment  # Steps to Enter: 0  One/Two Story Residence: One story  Living Alone: No  Support Systems: Friends \ neighbors, Home care staff  Patient Expects to be Discharged to[de-identified] Apartment  Current DME Used/Available at Home: Hospital bed, Wheelchair  Critical Behavior:  Neurologic State: Alert  Orientation Level: Oriented X4  Cognition: Follows commands     Psychosocial  Patient Behaviors: Calm; Cooperative  Family  Behaviors: Calm; Cooperative     Family  Behaviors: Calm; Cooperative  Strength:    Generally decreased, functional; able to perform Heel slides, AA, edema bilateral LE's, greater in dorsum of R foot     Tone & Sensation:   Tone: Normal     Range Of Motion:  AROM: Generally decreased, functional;  Limited by body habitus     Functional Mobility:  Bed Mobility:  Rolling: Contact guard assistance  Supine to Sit: Maximum assistance;Assist x1;Assist x2  Sit to Supine: Moderate assistance; Additional time;Assist x1     Balance:   Sitting: Impaired; With support  Sitting - Static: Fair (occasional); Supported sitting  Sitting - Dynamic: Poor (constant support)  Ambulation/Gait Training:  Gait Description (WDL):  (non ambulatory)    Pain:  Pain Scale 1: Numeric (0 - 10)  Pain Intensity 1: 0   Activity Tolerance:   Fair   Please refer to the flowsheet for vital signs taken during this treatment.   After treatment:   []         Patient left in no apparent distress sitting up in chair  [x]         Patient left in no apparent distress in bed  [x]         Call bell left within reach  [x]         Nursing notified  [x]         Caregiver present  []         Bed alarm activated    COMMUNICATION/EDUCATION:   [x] Fall prevention education was provided and the patient/caregiver indicated understanding. [x]         Patient/family have participated as able in goal setting and plan of care. []         Patient/family agree to work toward stated goals and plan of care. [x]         Patient understands intent and goals of therapy, but is neutral about his/her participation. []         Patient is unable to participate in goal setting and plan of care.     Thank you for this referral.  Julia Camacho, PT   Time Calculation: 33 mins

## 2018-02-16 NOTE — PROGRESS NOTES
Spoke with family members regarding this pt according to Dr. Lo Stafford, maybe ready for discharge today. Family was reminded of their request for long term care and if that was still the request of the family not to return this pt home? Spoke with pt's sister, Montserrat Mitchell [phone].

## 2018-02-16 NOTE — WOUND CARE
Physical Exam   Room 461: pt has closed scar on right heel from a previous pressure injury. Pt has friction injury on right posterior thigh that is almost closed. Pt has hypopigmentation to various other areas of her body, no drainage. Wound Thigh Right;Posterior (Active)   DRESSING TYPE Open to air 2/16/2018 10:28 AM   Non-Pressure Injury Partial thickness (epider/derm) 2/16/2018 10:28 AM   Wound Length (cm) 1 cm 2/16/2018 10:28 AM   Wound Width (cm) 0.5 cm 2/16/2018 10:28 AM   Wound Depth (cm) 0.1 2/16/2018 10:28 AM   Wound Surface area (cm^2) 0.5 cm^2 2/16/2018 10:28 AM   Condition of Base red 2/16/2018 10:28 AM   Condition of Edges Closed 2/16/2018 10:28 AM   Epithelialization (%) 95 % 2/16/2018 10:28 AM   Tissue Type Red 2/16/2018 10:28 AM   Tissue Type Percent Red 100 2/16/2018 10:28 AM   Drainage Amount  None 2/16/2018 10:28 AM   Wound Odor None 2/16/2018 10:28 AM   Periwound Skin Condition Intact with epithelial cells 2/16/2018 10:28 AM   Dressing Type Applied Open to air 2/16/2018 10:28 AM   Procedure Tolerated Well 2/16/2018 10:28 AM   Number of days:2   Wound Heel Right (Active)   DRESSING TYPE Open to air 2/16/2018 10:28 AM   Condition of Base Epithelializing 2/16/2018 10:28 AM   Condition of Edges Closed;Calloused 2/16/2018 10:28 AM   Epithelialization (%) 100 % 2/16/2018 10:28 AM   Drainage Amount  None 2/16/2018 10:28 AM   Wound Odor None 2/16/2018 10:28 AM   Periwound Skin Condition Intact 2/16/2018 10:28 AM   Dressing Type Applied Open to air 2/16/2018 10:28 AM   Procedure Tolerated Well 2/16/2018 10:28 AM   Number of days:0   Pt/sister Minnie Mcburney agreeable to recommended treatment. Wound care education provided to pt/sister Anisha Allisonrowenaminnie at this time. Gave tips on skin care, skin fold management, moisture management, & gave chair cushion for home for times when she will   Get up to the chair at home. Will turn over care to nursing staff at this time.   Cherie OROURKE, RN, [facility], [address]

## 2018-02-16 NOTE — PROGRESS NOTES
Progress Note    Patient: Sola Ards MRN: [id number]  SSN: [id number]    YOB: 1965  Age: 50 y.o. Sex: female      Admit Date: 2/14/2018    LOS: 2 days     Subjective:     Patient with craniosynestosis admitted due to increased weakness with edema and inability to ambulate. Edema better with lasix. Objective:     Vitals:    02/16/18 0800 02/16/18 1117 02/16/18 1224 02/16/18 1600   BP: 114/70  115/66 117/82   Pulse: 96  93 93   Resp: 16 16 17   Temp: 99.1 °F (37.3 °C)  97.9 °F (36.6 °C) 99.6 °F (37.6 °C)   SpO2: 92%  93% 96%   Weight:  127.2 kg (280 lb 8 oz)     Height:  5' 4\" (1.626 m)          Intake and Output:  Current Shift:    Last three shifts: 02/14 1901 - 02/16 0700  In: 1100 [P.O.:1100]  Out: 900 [Urine:900]    Physical Exam:   GENERAL: alert, cooperative, no distress, slowed mentation, appears older than stated age  LUNG: clear to auscultation bilaterally  HEART: regular rate and rhythm, S1, S2 normal, no murmur, click, rub or gallop    Lab/Data Review: All lab results for the last 24 hours reviewed. Glucose 92    Assessment:     Active Problems:    Shortness of breath (2/14/2018)      Weakness of both legs (2/14/2018)      Craniostenosis (2/14/2018)      Peripheral edema (2/14/2018)        Plan: Will plan on sending home with home health with [facility] lift.     Signed By: Ranjit Gilbert MD     February 16, 2018

## 2018-02-16 NOTE — PROGRESS NOTES
Problem: Self Care Deficits Care Plan (Adult)  Goal: *Acute Goals and Plan of Care (Insert Text)  Occupational Therapy Goals  Initiated 2/16/2018 within 7 day(s). 1.  Patient will perform grooming with supervision/set-up. 2.  Patient will perform upper body dressing with supervision/set-up. 3.  Patient will perform functional task for 5 minutes while seated on EOB with supervision for balance. 4.  Patient will perform toilet transfers with moderate assistance x2. Outcome: Progressing Towards Goal  Occupational Therapy EVALUATION    Patient: Santino Elizabeth (45 y.o. female)  Date: 2/16/2018  Primary Diagnosis: Shortness of breath        Precautions:   Fall, Skin    ASSESSMENT :  Based on the objective data described below, the patient presents with decreased ADLs, decreased functional mobility and decreased endurance vs her PLOF. Patient has an aide twice daily to assist her with ADLs and transfers to the BSC/recliner chair. Recently, patient has been unsafe to transfer to the chair secondary to LE weakness. Today, she sat on the EOB with max assist from supine and min assist for balance in prep for self care task. Supportive family in room. Patient able to perform UB and grooming activities. She lives with her father who is unable to assist with transfers. Family requesting a annette lift for home and has all other needed DME for home safety. Discussed recommendation of SNF to maximize patient's independence/safety before returning home. They verbalized understanding. Patient will benefit from skilled intervention to address the above impairments.   Patients rehabilitation potential is considered to be Good  Factors which may influence rehabilitation potential include:   []             None noted  []             Mental ability/status  [x]             Medical condition  []             Home/family situation and support systems  []             Safety awareness  []             Pain tolerance/management  []             Other:      PLAN :  Recommendations and Planned Interventions:  [x]               Self Care Training                  [x]        Therapeutic Activities  [x]               Functional Mobility Training    []        Cognitive Retraining  [x]               Therapeutic Exercises           [x]        Endurance Activities  [x]               Balance Training                   []        Neuromuscular Re-Education  []               Visual/Perceptual Training     [x]   Home Safety Training  [x]               Patient Education                 [x]        Family Training/Education  []               Other (comment):    Frequency/Duration: Patient will be followed by occupational therapy 1-2 times per day/4-7 days per week to address goals. Discharge Recommendations: [address] with lift   Further Equipment Recommendations for Discharge: mechanical lift     Barriers to Learning/Limitations: None  Compensate with: visual, verbal, tactile, kinesthetic cues/model     PATIENT COMPLEXITY      Eval Complexity: History: MEDIUM Complexity : Expanded review of history including physical, cognitive and psychosocial  history ; Examination: MEDIUM Complexity : 3-5 performance deficits relating to physical, cognitive , or psychosocial skils that result in activity limitations and / or participation restrictions; Decision Making:MEDIUM Complexity : Patient may present with comorbidities that affect occupational performnce. Miniml to moderate modification of tasks or assistance (eg, physical or verbal ) with assesment(s) is necessary to enable patient to complete evaluation  Assessment: Moderate Complexity     G-CODES:     Self Care  Current  CL= 60-79%   Goal  CK= 40-59%. The severity rating is based on the Level of Assistance required for Functional Mobility and ADLs. SUBJECTIVE:   Patient stated I can help with my bathing and dressing.     OBJECTIVE DATA SUMMARY: Past Medical History:   Diagnosis Date    Cerebral palsy ([address]     Hematoma     left tibia    Osteoarthritis of right knee     Osteoporosis     diffuse    Pressure ulcer of heel     right    Scoliosis     thoracolumar    Sprain     right lower leg and ankle     Past Surgical History:   Procedure Laterality Date    HX ORTHOPAEDIC      leg surgery x 2    HX OTHER SURGICAL      shunts inserted in left and right side of head     Prior Level of Function/Home Situation: Pt required min to mod assist with basic self care tasks and functional mobility PTA. Home Situation  Home Environment: Apartment  # Steps to Enter: 0  One/Two Story Residence: One story  Living Alone: No  Support Systems: Friends \ neighbors, Home care staff  Patient Expects to be Discharged to[de-identified] Apartment  Current DME Used/Available at Home: Hospital bed, Wheelchair  Tub or Shower Type:  (Pt sponge bathes at home.)  []  Right hand dominant   [x]  Left hand dominant  Cognitive/Behavioral Status:  Neurologic State: Alert  Orientation Level: Oriented X4  Cognition: Follows commands  Safety/Judgement: Awareness of environment; Fall prevention    Skin: Intact on UEs    Edema: None noted in UEs    Vision/Perceptual:    Acuity: Able to read clock/calendar on wall without difficulty      Coordination:  Fine Motor Skills-Upper: Left Intact; Right Intact    Gross Motor Skills-Upper: Left Intact; Right Intact    Balance:  Sitting: Impaired; With support  Sitting - Static: Fair (occasional); Supported sitting  Sitting - Dynamic: Poor (constant support)    Strength:  Strength: Within functional limits (UEs)    Tone & Sensation:  Tone: Normal (UEs)  Sensation: Intact (UEs)    Range of Motion:  AROM: Within functional limits (UEs)    Functional Mobility and Transfers for ADLs:  Bed Mobility:  Rolling: Contact guard assistance  Supine to Sit: Maximum assistance;Assist x1;Assist x2  Sit to Supine: Moderate assistance; Additional time;Assist x1  Transfers: Toilet Transfer :  (NT)    ADL Assessment:  Feeding: Setup;Supervision    Oral Facial Hygiene/Grooming: Minimum assistance    Bathing: Moderate assistance    Upper Body Dressing: Minimum assistance    Lower Body Dressing: Maximum assistance    Toileting: Maximum assistance    ADL Intervention:  Patient sitting on EOB and doffed/donned hospital gown with min assist secondary to decreased balance. She was able to brush her teeth after setup. Cognitive Retraining  Safety/Judgement: Awareness of environment; Fall prevention    Pain:  Pt reports 0/10 pain or discomfort prior to treatment.    Pt reports 0/10 pain or discomfort post treatment. Activity Tolerance:   Good    Please refer to the flowsheet for vital signs taken during this treatment. After treatment:   [] Patient left in no apparent distress sitting up in chair  [x] Patient left in no apparent distress in bed  [x] Call bell left within reach  [] Nursing notified  [] Caregiver present  [] Bed alarm activated    COMMUNICATION/EDUCATION:   [x] Home safety education was provided and the patient/caregiver indicated understanding. [x] Patient/family have participated as able in goal setting and plan of care. [x] Patient/family agree to work toward stated goals and plan of care. [] Patient understands intent and goals of therapy, but is neutral about his/her participation. [] Patient is unable to participate in goal setting and plan of care.     Thank you for this referral.  Venus De Dios MS OTR/L  Time Calculation: 25 mins

## 2018-02-16 NOTE — PROGRESS NOTES
Pt's family have decided against placement in a local SNF, preferring to have delivered a [employer] Lift to assist with the care of this pt when she arrives home. This writer was informed that although this was requested it will need to be placed to [facility] in the a.m. For a delivery date. [phone]. This writer called Dr. Lopez Link office for order for the [employer], but have not received as of this time. This writer will ask on-call weekend CM to request and have pt to receive it. Pt can be transported home by medical transport when Nazarje is in place in the home. Dr. Joslyn Hill came to this facility tonight and this writer faxed all necessary paperwork for the home juarez at for discharge.

## 2018-02-16 NOTE — PROGRESS NOTES
Progress Note    Patient: Antwan Greco MRN: [id number]  SSN: [id number]    YOB: 1965  Age: 50 y.o. Sex: female      Admit Date: 2/14/2018    LOS: 1 day     Subjective:     Patient with craniosynestosis admitted due to edema and increased weakness. Objective:     Vitals:    02/15/18 0424 02/15/18 0726 02/15/18 1132 02/15/18 1628   BP: 100/72 129/80 103/67 126/82   Pulse: 86 89 79 92   Resp: 18 18 20 20   Temp: 97.3 °F (36.3 °C) 98.1 °F (36.7 °C) 97.5 °F (36.4 °C) 97.7 °F (36.5 °C)   SpO2: 96%  95% 94%        Intake and Output:  Current Shift:    Last three shifts: 02/14 0701 - 02/15 1900  In: 1100 [P.O.:1100]  Out: 500 [Urine:500]    Physical Exam:   GENERAL: alert, cooperative, no distress, slowed mentation, appears stated age  LUNG: clear to auscultation bilaterally  HEART: regular rate and rhythm, S1, S2 normal, no murmur, click, rub or gallop    Lab/Data Review: All lab results for the last 24 hours reviewed. ECHO EF 60 no valvular disease    Assessment:     Active Problems:    Shortness of breath (2/14/2018)      Weakness of both legs (2/14/2018)      Craniostenosis (2/14/2018)      Peripheral edema (2/14/2018)        Plan:     OT and PT plan for placement.     Signed By: Susan Huston MD     February 15, 2018

## 2018-02-17 PROCEDURE — 74011250637 HC RX REV CODE- 250/637: Performed by: FAMILY MEDICINE

## 2018-02-17 PROCEDURE — 77030011256 HC DRSG MEPILEX <16IN NO BORD MOLN -A

## 2018-02-17 PROCEDURE — 65270000029 HC RM PRIVATE

## 2018-02-17 RX ORDER — DOCUSATE SODIUM 100 MG/1
100 CAPSULE, LIQUID FILLED ORAL 2 TIMES DAILY
Status: DISCONTINUED | OUTPATIENT
Start: 2018-02-17 | End: 2018-02-19 | Stop reason: HOSPADM

## 2018-02-17 RX ADMIN — VITAMIN A AND VITAMIN D: 929.3 OINTMENT TOPICAL at 08:09

## 2018-02-17 RX ADMIN — OYSTER SHELL CALCIUM WITH VITAMIN D 1 TABLET: 500; 200 TABLET, FILM COATED ORAL at 08:11

## 2018-02-17 RX ADMIN — FUROSEMIDE 20 MG: 20 TABLET ORAL at 08:08

## 2018-02-17 RX ADMIN — VITAMIN A AND VITAMIN D: 929.3 OINTMENT TOPICAL at 18:29

## 2018-02-17 RX ADMIN — DOCUSATE SODIUM 100 MG: 100 CAPSULE, LIQUID FILLED ORAL at 18:29

## 2018-02-17 RX ADMIN — THERA TABS 1 TABLET: TAB at 08:07

## 2018-02-17 RX ADMIN — POTASSIUM CHLORIDE 10 MEQ: 10 TABLET, EXTENDED RELEASE ORAL at 08:08

## 2018-02-17 NOTE — ROUTINE PROCESS
Bedside and Verbal shift change report given to Freida Yi LPN (oncoming nurse) by Abelardo Jaramillo RN (offgoing nurse). Report included the following information SBAR, Kardex, MAR and Recent Results.     SITUATION:  Code Status: Full Code  Reason for Admission: Shortness of breath  Hospital day: 3  Problem List:       Hospital Problems  Date Reviewed: 2/14/2018          Codes Class Noted POA    Shortness of breath ICD-10-CM: R06.02  ICD-9-CM: 786.05  2/14/2018 Unknown        Weakness of both legs ICD-10-CM: R29.898  ICD-9-CM: 729.89  2/14/2018 Unknown        Craniostenosis ICD-10-CM: Q75.0  ICD-9-CM: 756.0  2/14/2018 Unknown        Peripheral edema ICD-10-CM: R60.9  ICD-9-CM: 782.3  2/14/2018 Unknown              BACKGROUND:   Past Medical History:   Past Medical History:   Diagnosis Date    Cerebral palsy ([address]     Hematoma     left tibia    Osteoarthritis of right knee     Osteoporosis     diffuse    Pressure ulcer of heel     right    Scoliosis     thoracolumar    Sprain     right lower leg and ankle      Patient taking anticoagulants no    Patient has a defibrillator: no    History of shots YES for example, flu, pneumonia, tetanus   Isolation History NO for example, MRSA, CDiff    ASSESSMENT:  Changes in Assessment Throughout Shift: none  Significant Changes in 24 hours (for example, RR/code, fall)  Patient has Central Line: no   Patient has Klein Cath: no   Mobility Issues  PT  IV Patency  OR Checklist  Pending Tests    Last Vitals:  Vitals w/ MEWS Score (last day)     Date/Time MEWS Score Pulse Resp Temp BP Level of Consciousness SpO2    02/17/18 0709 1 84 18 97.1 °F (36.2 °C) 120/72 Alert 97 %    02/17/18 0352 1 79 18 98 °F (36.7 °C) 119/81 Alert 94 %    02/16/18 2339 1 81 18 97.6 °F (36.4 °C) 120/80 Alert 95 %    02/16/18 2000 -- 95 18 98.4 °F (36.9 °C) -- Alert 96 %    02/16/18 1600 1 93 17 99.6 °F (37.6 °C) 117/82 Alert 96 %    02/16/18 1224 1 93 16 97.9 °F (36.6 °C) 115/66 Alert 93 %    02/16/18 0800 1 96 16 99.1 °F (37.3 °C) 114/70 Alert 92 %    02/16/18 0356 1 86 18 97.5 °F (36.4 °C) 114/79 Alert 96 %    02/16/18 0025 1 89 20 98.6 °F (37 °C) 117/76 Alert 96 %            PAIN    Pain Assessment    Pain Intensity 1: 0 (02/17/18 0352)              Patient Stated Pain Goal: 0  Intervention effective: yes  Time of last intervention: Denied pain Reassessment Completed: yes   Other actions taken for pain: Distraction    Last 3 Weights:  Last 3 Recorded Weights in this Encounter    02/16/18 1117   Weight: 127.2 kg (280 lb 8 oz)   Weight change:     INTAKE/OUPUT    Current Shift: 02/17 0701 - 02/17 1900  In: 120 [P.O.:120]  Out: 500 [Urine:500]    Last three shifts: 02/15 1901 - 02/17 0700  In: -   Out: 400 [Urine:400]    RECOMMENDATIONS AND DISCHARGE PLANNING  Patient needs and requests: Comfort and safety    Pending tests/procedures: labs     Discharge plan for patient: Home with [address] [employer]    Discharge planning Needs or Barriers: none    Estimated Discharge Date: 2/16/2018 Posted on Whiteboard in Patients Room: yes       \"HEALS\" SAFETY CHECK  A safety check occurred in the patient's room between off going nurse and oncoming nurse listed above. The safety check included the below items:    H  High Alert Medications Verify all high alert medication drips (heparin, PCA, etc.)  E  Equipment Suction is set up for ALL patients (with yanker)  Red plugs utilized for all equipment (IV pumps, etc.)  WOWs wiped down at end of shift. Room stocked with oxygen, suction, and other unit-specific supplies  A  Alarms Bed alarm is set for fall risk patients  Ensure chair alarm is in place and activated if patient is up in a chair  L  Lines Check IV for any infiltration  Klein bag is empty if patient has a Klein   Tubing and IV bags are labeled  S  Safety  Room is clean, patient is clean, and equipment is clean. Hallways are clear from equipment besides carts.    Fall bracelet on for fall risk patients  Ensure room is clear and free of clutter  Suction is set up for ALL patients (with lalito)  Hallways are clear from equipment besides carts.    Isolation precautions followed, supplies available outside room, sign posted    Marly Bella RN

## 2018-02-17 NOTE — PROGRESS NOTES
Progress Note    Patient: Dajuan Novak MRN: [id number]  CSN: [id number]    YOB: 1965  Age: 50 y.o. Sex: female    DOA: 2/14/2018 LOS:  LOS: 3 days                    Subjective:   54yo F w/ hx of craniostenosis presented to ED on 2/14 c/o SOB, edema and increased weakness. CXR on 2/14 showed hypoinflation. Admitted for edema eval and SOB.     (2/17) Currently feeling better and still continuing lasix. Denies SOB, increasing edema, chest pain, palpitations, cough. Looking to get discharged today but the family is waiting for [facility] lift approval for her at home.      An excess of 15 minutes was spent w/ patient and her brother discussing treatment and plan.     Hospital Course:     CT on 2/14  IMPRESSION:   [No evidence of intracranial hemorrhages or any other definable acute  intracranial abnormality.      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      Bilateral ventriculoperitoneal shunt tubes in position. Ventricular system  appears to be decompressed.      Postoperative changes with defect involving the midline occipital bone and  lateral portion of left parietal bone.      Postoperative/atrophy changes involving the upper aspect of cerebellum and  bilateral occipital lobes.     EKG 2/14:    Diagnosis     Final      Normal sinus rhythm   Possible Right ventricular hypertrophy   Nonspecific ST and T wave abnormality   Abnormal ECG   No previous ECGs available       ECHO:  IMPRESSIONS:  A clinically significant myopathic process is ruled out. There was no   significant valvular heart disease. SUMMARY:  Left ventricle: Systolic function was normal. Ejection fraction was estimated   in the range of 55 % to 60 %.  There were  no regional wall motion abnormalities.               Chief Complaint:   Chief Complaint   Patient presents with    Shortness of Breath       Review of systems  General: No fevers or chills. Cardiovascular: No chest pain or pressure. No palpitations. Pulmonary: No shortness of breath, cough or wheeze. Gastrointestinal: No abdominal pain, nausea, vomiting or diarrhea. Genitourinary: No urinary frequency, urgency, hesitancy or dysuria. Musculoskeletal: No joint or muscle pain, no back pain, no recent trauma. Neurologic: No headache, numbness, tingling or weakness. Objective:     Physical Exam:  Visit Vitals    /72 (BP 1 Location: Right arm, BP Patient Position: At rest)    Pulse 84    Temp 97.1 °F (36.2 °C)    Resp 18    Ht 5' 4\" (1.626 m)    Wt 127.2 kg (280 lb 8 oz)    SpO2 97%    Breastfeeding No    BMI 48.15 kg/m2        General:         Alert, cooperative, no acute distress    HEENT: NC, Atraumatic. PERRLA, anicteric sclerae. Lungs: CTA Bilaterally. No Wheezing/Rhonchi/Rales. Heart:  Regular  rhythm,  No murmur, No Rubs, No Gallops  Abdomen: Soft, Non distended, Non tender.  +Bowel sounds, no HSM  Extremities: 2+ pitting edema bilat lower extremities  Psych:   Good insight. Not anxious or agitated. Neurologic:  CN 2-12 grossly intact, Alert and oriented X 3. Slow mentation baseline                               Intake and Output:  Current Shift:  02/17 0701 - 02/17 1900  In: 440 [P.O.:440]  Out: 500 [Urine:500]  Last three shifts:  02/15 1901 - 02/17 0700  In: 240 [P.O.:240]  Out: 850 [Urine:850]    Labs: Results:       Chemistry Recent Labs      02/14/18   1547   GLU  93   NA  145   K  3.9   CL  107   CO2  30   BUN  13   CREA  0.55*   CA  8.7   AGAP  8   BUCR  24*   AP  138*  138*   TP  7.0  6.9   ALB  3.6  3.5   GLOB  3.4  3.4   AGRAT  1.1  1.0      CBC w/Diff Recent Labs      02/14/18   1353   WBC  4.2*   RBC  4.91   HGB  13.7   HCT  41.9   PLT  190   GRANS  57   LYMPH  31   EOS  5      Cardiac Enzymes No results for input(s): CPK, CKND1, MAANV in the last 72 hours.     No lab exists for component: CKRMB, TROIP   Coagulation No results for input(s): PTP, INR, APTT in the last 72 hours. No lab exists for component: INREXT    Lipid Panel No results found for: CHOL, CHOLPOCT, CHOLX, CHLST, CHOLV, 631146, HDL, LDL, LDLC, DLDLP, 351774, VLDLC, VLDL, TGLX, TRIGL, TRIGP, TGLPOCT, CHHD, CHHDX   BNP No results for input(s): BNPP in the last 72 hours. Liver Enzymes Recent Labs      02/14/18   1547   TP  7.0  6.9   ALB  3.6  3.5   AP  138*  138*   SGOT  28  24      Thyroid Studies Lab Results   Component Value Date/Time    TSH 0.75 02/14/2018 03:47 PM          Procedures/imaging: see electronic medical records for all procedures/Xrays and details which were not copied into this note but were reviewed prior to creation of Plan    Medications:   Current Facility-Administered Medications   Medication Dose Route Frequency    furosemide (LASIX) tablet 20 mg  20 mg Oral DAILY    potassium chloride (KLOR-CON) tablet 10 mEq  10 mEq Oral DAILY    calcium-vitamin D (OS-JOSH) 500 mg-200 unit tablet  1 Tab Oral DAILY WITH BREAKFAST    therapeutic multivitamin (THERAGRAN) tablet 1 Tab  1 Tab Oral DAILY    vits A and D-white pet-lanolin (A&D) ointment   Topical BID       Assessment/Plan     Active Problems:    Shortness of breath (2/14/2018)      Weakness of both legs (2/14/2018)      Craniostenosis (2/14/2018)      Peripheral edema (2/14/2018)        Shortness of breath  -Resolved     Leg Weakness/Non-ambulatory  -[facility] lift  (awaiting approval)     Peripheral Edema  -Monitor  -Lasix     Pressure ulcer of R heel, Wound on post thigh  -Friction injury to thigh has almost closed. -Scar on R heel from previous injury. Monitor  -Wound care education provided to pt/sister Marisol at this time.   -Gave tips on skin care, skin fold management, moisture management, & gave chair cushion for home for times when she will   -Get up to the chair at home.        Blanche Brown  2/17/2018 10:58 AM

## 2018-02-17 NOTE — PROGRESS NOTES
Progress Note    Patient: Stewart Deshpande MRN: [id number]  CSN: [id number]    YOB: 1965  Age: 50 y.o. Sex: female    DOA: 2/14/2018 LOS:  LOS: 3 days                    Subjective:   54yo F w/ hx of craniostenosis presented to ED on 2/14 c/o SOB, edema and increased weakness. CXR on 2/14 showed hypoinflation. Admitted for edema eval and SOB.     (2/17) Currently feeling better and still continuing lasix. Denies SOB, increasing edema, chest pain, palpitations, cough. Looking to get discharged today but the family is waiting for [facility] lift approval for her at home.      An excess of 15 minutes was spent w/ patient and her brother discussing treatment and plan.     Hospital Course:     CT on 2/14  IMPRESSION:   [No evidence of intracranial hemorrhages or any other definable acute  intracranial abnormality.      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      Bilateral ventriculoperitoneal shunt tubes in position. Ventricular system  appears to be decompressed.      Postoperative changes with defect involving the midline occipital bone and  lateral portion of left parietal bone.      Postoperative/atrophy changes involving the upper aspect of cerebellum and  bilateral occipital lobes.     EKG 2/14:    Diagnosis     Final      Normal sinus rhythm   Possible Right ventricular hypertrophy   Nonspecific ST and T wave abnormality   Abnormal ECG   No previous ECGs available       ECHO:  IMPRESSIONS:  A clinically significant myopathic process is ruled out. There was no   significant valvular heart disease. SUMMARY:  Left ventricle: Systolic function was normal. Ejection fraction was estimated   in the range of 55 % to 60 %.  There were  no regional wall motion abnormalities.               Chief Complaint:   Chief Complaint   Patient presents with    Shortness of Breath       Review of systems  General: No fevers or chills. Cardiovascular: No chest pain or pressure. No palpitations. Pulmonary: No shortness of breath, cough or wheeze. Gastrointestinal: No abdominal pain, nausea, vomiting or diarrhea. Genitourinary: female catheter  Musculoskeletal: No joint or muscle pain, no back pain, no recent trauma. Neurologic: No headache, numbness weakness all over more in the lower extremities at her base line     Objective:     Physical Exam:  Visit Vitals    /72 (BP 1 Location: Right arm, BP Patient Position: At rest)    Pulse 84    Temp 97.1 °F (36.2 °C)    Resp 18    Ht 5' 4\" (1.626 m)    Wt 127.2 kg (280 lb 8 oz)    SpO2 97%    Breastfeeding No    BMI 48.15 kg/m2        General:         Alert, cooperative, no acute distress    HEENT: NC, Atraumatic. PERRLA, anicteric sclerae. Lungs: CTA Bilaterally. No Wheezing/Rhonchi/Rales. Heart:  Regular  rhythm,  No murmur, No Rubs, No Gallops  Abdomen: Soft, Non distended, Non tender.  +Bowel sounds, no HSM  Extremities: 2+ pitting edema bilat lower extremities  Psych:    Not anxious or agitated. Neurologic:  CN 2-12 grossly intact, Alert and  Slow mentation baseline                               Intake and Output:  Current Shift:  02/17 0701 - 02/17 1900  In: 440 [P.O.:440]  Out: 500 [Urine:500]  Last three shifts:  02/15 1901 - 02/17 0700  In: 240 [P.O.:240]  Out: 850 [Urine:850]    Labs: Results:       Chemistry Recent Labs      02/14/18   1547   GLU  93   NA  145   K  3.9   CL  107   CO2  30   BUN  13   CREA  0.55*   CA  8.7   AGAP  8   BUCR  24*   AP  138*  138*   TP  7.0  6.9   ALB  3.6  3.5   GLOB  3.4  3.4   AGRAT  1.1  1.0      CBC w/Diff Recent Labs      02/14/18   1353   WBC  4.2*   RBC  4.91   HGB  13.7   HCT  41.9   PLT  190   GRANS  57   LYMPH  31   EOS  5      Cardiac Enzymes No results for input(s): CPK, CKND1, MANAV in the last 72 hours.     No lab exists for component: CKRMB, TROIP   Coagulation No results for input(s): PTP, INR, APTT in the last 72 hours. No lab exists for component: INREXT, INREXT    Lipid Panel No results found for: CHOL, CHOLPOCT, CHOLX, CHLST, CHOLV, 089248, HDL, LDL, LDLC, DLDLP, 360859, VLDLC, VLDL, TGLX, TRIGL, TRIGP, TGLPOCT, CHHD, CHHDX   BNP No results for input(s): BNPP in the last 72 hours. Liver Enzymes Recent Labs      02/14/18   1547   TP  7.0  6.9   ALB  3.6  3.5   AP  138*  138*   SGOT  28  24      Thyroid Studies Lab Results   Component Value Date/Time    TSH 0.75 02/14/2018 03:47 PM          Procedures/imaging: see electronic medical records for all procedures/Xrays and details which were not copied into this note but were reviewed prior to creation of Plan    Medications:   Current Facility-Administered Medications   Medication Dose Route Frequency    furosemide (LASIX) tablet 20 mg  20 mg Oral DAILY    potassium chloride (KLOR-CON) tablet 10 mEq  10 mEq Oral DAILY    calcium-vitamin D (OS-JOSH) 500 mg-200 unit tablet  1 Tab Oral DAILY WITH BREAKFAST    therapeutic multivitamin (THERAGRAN) tablet 1 Tab  1 Tab Oral DAILY    vits A and D-white pet-lanolin (A&D) ointment   Topical BID       Assessment/Plan     Active Problems:    Shortness of breath (2/14/2018)      Weakness of both legs (2/14/2018)      Craniostenosis (2/14/2018)      Peripheral edema (2/14/2018)        Shortness of breath  -Resolved     Leg Weakness/Non-ambulatory  -[facility] lift  (awaiting approval)  - discussed with case manger she will not be able to get over the weekend     Peripheral Edema  -Monitor  -Lasix  - venous duplex ultrasound B/L leg      Pressure ulcer of R heel, Wound on post thigh  -Friction injury to thigh has almost closed. -Scar on R heel from previous injury. Monitor  -Wound care education provided to pt/sister Marisol at this time.   -Gave tips on skin care, skin fold management, moisture management, & gave chair cushion for home for times when she will   -Get up to the chair at home.   -pt and ot evaluation and treatment   - cbc, cmp, mag     Trevor Perez MD  2/17/2018 10:58 AM

## 2018-02-17 NOTE — PROGRESS NOTES
Care Management Interventions  PCP Verified by CM: Yes (DR. Shauna Givens)  Palliative Care Criteria Met (RRAT>21 & CHF Dx)?: No  Mode of Transport at Discharge: BLS  Transition of Care Consult (CM Consult): Discharge Planning ([address])  Discharge Durable Medical Equipment: Yes ([employer] lift)  Current Support Network: Relative's Home, Family Lives Nearby, Has Personal Caregivers ([address])  Confirm Follow Up Transport: Other (see comment)  Plan discussed with Pt/Family/Caregiver: Yes (PT'S FATHER IS REQUESTING LONG TERM PLACEMENT, FATHER LATONYA CANNONVPWTN-[phone], DOES NOT FEEL HE CAN TAKE CARE OF PT AT [address] [facility])  Discharge Location  Discharge Placement: [address] [facility])     Call received from [facility] with [employer] who mentions that DME order will have to be forwarded to agency with Medicare bid. She will forward information to [facility]. Physician (Dr. Lionel Jacobs), pt and her brother Meghan Gill ([phone]) have been updated. DME will not be delivered until early next week. Pt's nurse has been informed. Family stresses that equipment needs to arrive to the home prior to pt's return. In addition to pt's brother and her father, pt's sister Ainsley Covington ([phone]) is also POC.

## 2018-02-17 NOTE — PROGRESS NOTES
Progress Note    Patient: Willie Melgar MRN: [id number]  SSN: [id number]    YOB: 1965  Age: 50 y.o. Sex: female      Admit Date: 2/14/2018    LOS: 3 days     Subjective:     56yo F w/ hx of craniostenosis presented to ED on 2/14 c/o SOB, edema and increased weakness. CXR on 2/14 showed hypoinflation. Admitted for edema eval and SOB.    (2/17) Currently feeling better and still continuing lasix. Denies SOB, increasing edema, chest pain, palpitations, cough. Looking to get discharged today but the family is waiting for [address] approval for her at home. An excess of 15 minutes was spent w/ patient and her brother discussing treatment and plan. Hospital Course:    CT on 2/14  IMPRESSION:   [No evidence of intracranial hemorrhages or any other definable acute  intracranial abnormality.     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     Bilateral ventriculoperitoneal shunt tubes in position. Ventricular system  appears to be decompressed.     Postoperative changes with defect involving the midline occipital bone and  lateral portion of left parietal bone.     Postoperative/atrophy changes involving the upper aspect of cerebellum and  bilateral occipital lobes. EKG 2/14:   Diagnosis   Final     Normal sinus rhythm   Possible Right ventricular hypertrophy   Nonspecific ST and T wave abnormality   Abnormal ECG   No previous ECGs available      ECHO:  IMPRESSIONS:  A clinically significant myopathic process is ruled out. There was no   significant valvular heart disease. SUMMARY:  Left ventricle: Systolic function was normal. Ejection fraction was estimated   in the range of 55 % to 60 %. There were  no regional wall motion abnormalities.     Objective:     Vitals:    02/16/18 2000 02/16/18 2339 02/17/18 0352 02/17/18 0709   BP:  120/80 119/81 120/72   Pulse: 95 81 79 84   Resp: 18 18 18 18   Temp: 98.4 °F (36.9 °C) 97.6 °F (36.4 °C) 98 °F (36.7 °C) 97.1 °F (36.2 °C)   SpO2: 96% 95% 94% 97%   Weight:       Height:            Intake and Output:  Current Shift: 02/17 0701 - 02/17 1900  In: 120 [P.O.:120]  Out: 500 [Urine:500]  Last three shifts: 02/15 1901 - 02/17 0700  In: -   Out: 400 [Urine:400]    Physical Exam:   GENERAL: alert, cooperative, no distress, appears stated age, slow mentation  LUNGS: CTA bilat  HEART: RRR, no MRG, S1/S1 normal  ABDOMINAL: Soft, non-tender, non-distended. Normal bowel sounds. No organomegaly  EXTREMITIES: 2+ pitting edema bilat LE     Physical Exam    Lab/Data Review:  Lab Results   Component Value Date/Time    Glucose 93 02/14/2018 03:47 PM    Glucose (POC) 92 02/15/2018 04:11 PM       ***    Assessment:     Active Problems:    Shortness of breath (2/14/2018)      Weakness of both legs (2/14/2018)      Craniostenosis (2/14/2018)      Peripheral edema (2/14/2018)        Plan:     Shortness of breath  -Resolved    Leg Weakness/Non-ambulatory  -[facility] lift  (awaiting approval)    Peripheral Edema  -Monitor  -Lasix    Pressure ulcer of R heel, Wound on post thigh  -Friction injury to thigh has almost closed. -Scar on R heel from previous injury. Monitor  -Wound care education provided to pt/sister Sera Borges at this time. -Gave tips on skin care, skin fold management, moisture management, & gave chair cushion for home for times when she will   -Get up to the chair at home.      Signed By: Qiana Dos Santos     February 17, 2018

## 2018-02-17 NOTE — PROGRESS NOTES
Problem: Falls - Risk of  Goal: *Absence of Falls  Document Demetrice Fall Risk and appropriate interventions in the flowsheet.    Outcome: Progressing Towards Goal  Fall Risk Interventions:                 Elimination Interventions: Bed/chair exit alarm, Call light in reach, Patient to call for help with toileting needs

## 2018-02-17 NOTE — PROGRESS NOTES
NUTRITION    Nursing Referral: Pressure Injury      RECOMMENDATIONS / PLAN:     - Add supplement: Home BID  - Add prune juice once daily  - Recommend starting bowel regimen; discussed with MD  - Continue RD inpatient monitoring and evaluation. NUTRITION INTERVENTIONS & DIAGNOSIS:     [x] Meals/snacks: general/healthful diet  [x] Medical food supplement therapy: initiate  [x] Collaboration and referral of nutrition care: pt discussed with Dr Lynette Pittman; discussed starting bowel regimen    Nutrition Diagnosis:  Increased nutrient needs (protein) related to promotion of wound healing as evidenced by pressure injury on right heel    ASSESSMENT:     Pt has fair/good appetite and meal intake; usually eats small meals per sister report. Neither one concerned at this time in regard to meal intake. Last BM was on 2/11; discussed report with MD. Pt agreeable to prune juice.  Noted pressure injury; pt agreeable to Home supplements    Average po intake adequate to meet patients estimated nutritional needs:   [x] Yes     [] No   [] Unable to determine at this time    Diet: DIET REGULAR      Food Allergies:  None known   Current Appetite:   [x] Good     [x] Fair     [] Poor     [] Other:  Appetite/meal intake prior to admission:   [x] Good     [x] Fair     [] Poor     [x] Other: usually eats small meals  Feeding Limitations:  [] Swallowing difficulty    [] Chewing difficulty    [] Other:  Current Meal Intake: Patient Vitals for the past 100 hrs:   % Diet Eaten   02/17/18 1321 50 %   02/17/18 1010 50 %   02/15/18 1747 45 %   02/15/18 1333 75 %   02/15/18 0911 50 %       BM:  2/11 - per pt report; stated usually has a BM q 3-4 days  Skin Integrity:  Friction on right thigh;  Stage II pressure injury on right heel  Edema:  1+ UEs, LEs  Pertinent Medications: Reviewed    Recent Labs      02/14/18   1547   NA  145   K  3.9   CL  107   CO2  30   GLU  93   BUN  13   CREA  0.55*   CA  8.7   ALB  3.6  3.5   SGOT  28  24   ALT  38 39       Intake/Output Summary (Last 24 hours) at 02/17/18 1410  Last data filed at 02/17/18 1321   Gross per 24 hour   Intake              920 ml   Output             1300 ml   Net             -380 ml       Anthropometrics:  Ht Readings from Last 1 Encounters:   02/16/18 5' 4\" (1.626 m)     Last 3 Recorded Weights in this Encounter    02/16/18 1117   Weight: 127.2 kg (280 lb 8 oz)     Body mass index is 48.15 kg/(m^2). Weight History:  Pt denied experiencing any recent changes in weight PTA    Weight Metrics 2/16/2018   Weight 280 lb 8 oz   BMI 48.15 kg/m2        Admitting Diagnosis: Shortness of breath  Pertinent PMHx: osteoporosis    Education Needs:        [x] None identified  [] Identified - Not appropriate at this time  []  Identified and addressed - refer to education log  Learning Limitations:   [x] None identified  [] Identified    Cultural, Restoration & ethnic food preferences:  [x] None identified    [] Identified and addressed     ESTIMATED NUTRITION NEEDS:     Calories: 1620-3361 kcal (MSJx1.2-1.3) based on  [x] Adjusted BW: 76 kg      [] IBW   Protein: 152-165 gm (1.2-1.3 gm/kg) based on  [x] Actual BW: 127 kg      [] IBW   Fluid: 1 mL/kcal     MONITORING & EVALUATION:     Nutrition Goal(s):   1. Po intake of meals will meet >75% of patient estimated nutritional needs within the next 7 days.   Outcome:  [] Met/Ongoing    []  Not Met    [x] New/Initial Goal     Monitoring:   [x] Food and beverage intake   [x] Diet order   [x] Nutrition-focused physical findings   [x] Treatment/therapy   [] Weight   [] Enteral nutrition intake        Previous Recommendations (for follow-up assessments only):     []   Implemented       []   Not Implemented (RD to address)      [] No Longer Appropriate     [] No Recommendation Made     Discharge Planning:  Regular diet  [x] Participated in care planning, discharge planning, & interdisciplinary rounds as appropriate      Nataly Sneed, [address]   Pager: [phone]

## 2018-02-18 LAB
ALBUMIN SERPL-MCNC: 3.1 G/DL (ref 3.4–5)
ALBUMIN/GLOB SERPL: 0.9 {RATIO} (ref 0.8–1.7)
ALP SERPL-CCNC: 117 U/L (ref 45–117)
ALT SERPL-CCNC: 28 U/L (ref 13–56)
ANION GAP SERPL CALC-SCNC: 7 MMOL/L (ref 3–18)
AST SERPL-CCNC: 27 U/L (ref 15–37)
BASOPHILS # BLD: 0 K/UL (ref 0–0.1)
BASOPHILS NFR BLD: 1 % (ref 0–2)
BILIRUB SERPL-MCNC: 0.5 MG/DL (ref 0.2–1)
BUN SERPL-MCNC: 15 MG/DL (ref 7–18)
BUN/CREAT SERPL: 21 (ref 12–20)
CALCIUM SERPL-MCNC: 8.2 MG/DL (ref 8.5–10.1)
CHLORIDE SERPL-SCNC: 106 MMOL/L (ref 100–108)
CO2 SERPL-SCNC: 29 MMOL/L (ref 21–32)
CREAT SERPL-MCNC: 0.7 MG/DL (ref 0.6–1.3)
DIFFERENTIAL METHOD BLD: ABNORMAL
EOSINOPHIL # BLD: 0.3 K/UL (ref 0–0.4)
EOSINOPHIL NFR BLD: 6 % (ref 0–5)
ERYTHROCYTE [DISTWIDTH] IN BLOOD BY AUTOMATED COUNT: 14.1 % (ref 11.6–14.5)
GLOBULIN SER CALC-MCNC: 3.3 G/DL (ref 2–4)
GLUCOSE SERPL-MCNC: 99 MG/DL (ref 74–99)
HCT VFR BLD AUTO: 41.8 % (ref 35–45)
HGB BLD-MCNC: 13.2 G/DL (ref 12–16)
LYMPHOCYTES # BLD: 1.7 K/UL (ref 0.9–3.6)
LYMPHOCYTES NFR BLD: 41 % (ref 21–52)
MAGNESIUM SERPL-MCNC: 2.2 MG/DL (ref 1.6–2.6)
MCH RBC QN AUTO: 27.4 PG (ref 24–34)
MCHC RBC AUTO-ENTMCNC: 31.6 G/DL (ref 31–37)
MCV RBC AUTO: 86.9 FL (ref 74–97)
MONOCYTES # BLD: 0.3 K/UL (ref 0.05–1.2)
MONOCYTES NFR BLD: 8 % (ref 3–10)
NEUTS SEG # BLD: 1.9 K/UL (ref 1.8–8)
NEUTS SEG NFR BLD: 44 % (ref 40–73)
PLATELET # BLD AUTO: 178 K/UL (ref 135–420)
PMV BLD AUTO: 10.5 FL (ref 9.2–11.8)
POTASSIUM SERPL-SCNC: 4 MMOL/L (ref 3.5–5.5)
PROT SERPL-MCNC: 6.4 G/DL (ref 6.4–8.2)
RBC # BLD AUTO: 4.81 M/UL (ref 4.2–5.3)
SODIUM SERPL-SCNC: 142 MMOL/L (ref 136–145)
WBC # BLD AUTO: 4.2 K/UL (ref 4.6–13.2)

## 2018-02-18 PROCEDURE — 65270000029 HC RM PRIVATE

## 2018-02-18 PROCEDURE — 85025 COMPLETE CBC W/AUTO DIFF WBC: CPT | Performed by: FAMILY MEDICINE

## 2018-02-18 PROCEDURE — 36415 COLL VENOUS BLD VENIPUNCTURE: CPT | Performed by: FAMILY MEDICINE

## 2018-02-18 PROCEDURE — 97530 THERAPEUTIC ACTIVITIES: CPT

## 2018-02-18 PROCEDURE — 74011250637 HC RX REV CODE- 250/637: Performed by: FAMILY MEDICINE

## 2018-02-18 PROCEDURE — 80053 COMPREHEN METABOLIC PANEL: CPT | Performed by: FAMILY MEDICINE

## 2018-02-18 PROCEDURE — 83735 ASSAY OF MAGNESIUM: CPT | Performed by: FAMILY MEDICINE

## 2018-02-18 RX ADMIN — THERA TABS 1 TABLET: TAB at 08:04

## 2018-02-18 RX ADMIN — VITAMIN A AND VITAMIN D: 929.3 OINTMENT TOPICAL at 18:00

## 2018-02-18 RX ADMIN — DOCUSATE SODIUM 100 MG: 100 CAPSULE, LIQUID FILLED ORAL at 08:03

## 2018-02-18 RX ADMIN — OYSTER SHELL CALCIUM WITH VITAMIN D 1 TABLET: 500; 200 TABLET, FILM COATED ORAL at 08:03

## 2018-02-18 RX ADMIN — FUROSEMIDE 20 MG: 20 TABLET ORAL at 08:52

## 2018-02-18 RX ADMIN — DOCUSATE SODIUM 100 MG: 100 CAPSULE, LIQUID FILLED ORAL at 18:05

## 2018-02-18 RX ADMIN — VITAMIN A AND VITAMIN D: 929.3 OINTMENT TOPICAL at 08:05

## 2018-02-18 RX ADMIN — POTASSIUM CHLORIDE 10 MEQ: 10 TABLET, EXTENDED RELEASE ORAL at 08:52

## 2018-02-18 NOTE — PROGRESS NOTES
Progress Note    Patient: Yee Dong MRN: [id number]  CSN: [id number]    YOB: 1965  Age: 50 y.o. Sex: female    DOA: 2/14/2018 LOS:  LOS: 4 days                    Subjective:   56yo F w/ hx of craniostenosis presented to ED on 2/14 c/o SOB, edema and increased weakness. CXR on 2/14 showed hypoinflation. Admitted for edema eval and SOB. Venous duplex ultrasound leg pending pt had surgery Rt leg in the past has more swelling Rt leg than Lt     (2/17) Currently feeling better and still continuing lasix. Denies SOB, increasing edema, chest pain, palpitations, cough. Looking to get discharged today but the family is waiting for [facility] lift approval for her at home.      An excess of 15 minutes was spent w/ patient and her brother discussing treatment and plan.     Hospital Course:     CT on 2/14  IMPRESSION:   [No evidence of intracranial hemorrhages or any other definable acute  intracranial abnormality.      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      Bilateral ventriculoperitoneal shunt tubes in position. Ventricular system  appears to be decompressed.      Postoperative changes with defect involving the midline occipital bone and  lateral portion of left parietal bone.      Postoperative/atrophy changes involving the upper aspect of cerebellum and  bilateral occipital lobes.     EKG 2/14:    Diagnosis     Final      Normal sinus rhythm   Possible Right ventricular hypertrophy   Nonspecific ST and T wave abnormality   Abnormal ECG   No previous ECGs available       ECHO:  IMPRESSIONS:  A clinically significant myopathic process is ruled out. There was no   significant valvular heart disease. SUMMARY:  Left ventricle: Systolic function was normal. Ejection fraction was estimated   in the range of 55 % to 60 %.  There were  no regional wall motion abnormalities.               Chief Complaint:   Chief Complaint   Patient presents with    Shortness of Breath       Review of systems  General: No fevers or chills. Cardiovascular: No chest pain or pressure. No palpitations. Pulmonary: No shortness of breath, cough or wheeze. Gastrointestinal: No abdominal pain, nausea, vomiting or diarrhea. Genitourinary: female catheter  Musculoskeletal: No joint or muscle pain, no back pain, no recent trauma. Neurologic: No headache, numbness weakness all over more in the lower extremities at her base line     Objective:     Physical Exam:  Visit Vitals    /82 (BP 1 Location: Right arm, BP Patient Position: At rest)    Pulse 98    Temp 96.9 °F (36.1 °C)    Resp 20    Ht 5' 4\" (1.626 m)    Wt 127.2 kg (280 lb 8 oz)    SpO2 95%    Breastfeeding No    BMI 48.15 kg/m2        General:         Alert, cooperative, no acute distress    HEENT: NC, Atraumatic. PERRLA, anicteric sclerae. Lungs: CTA Bilaterally. No Wheezing/Rhonchi/Rales. Heart:  Regular  rhythm,  No murmur, No Rubs, No Gallops  Abdomen: Soft, Non distended, Non tender.  +Bowel sounds, no HSM  Extremities: 2+ pitting edema bilat lower extremities more in the RT  Psych:    Not anxious or agitated. Neurologic:  CN 2-12 grossly intact, Alert and  Slow mentation baseline                               Intake and Output:  Current Shift:  02/18 0701 - 02/18 1900  In: 245 [P.O.:245]  Out: -   Last three shifts:  02/16 1901 - 02/18 0700  In: 920 [P.O.:920]  Out: 1900 [Urine:1900]    Labs: Results:       Chemistry Recent Labs      02/18/18   0117   GLU  99   NA  142   K  4.0   CL  106   CO2  29   BUN  15   CREA  0.70   CA  8.2*   AGAP  7   BUCR  21*   AP  117   TP  6.4   ALB  3.1*   GLOB  3.3   AGRAT  0.9      CBC w/Diff Recent Labs      02/18/18   0117   WBC  4.2*   RBC  4.81   HGB  13.2   HCT  41.8   PLT  178   GRANS  44   LYMPH  41   EOS  6*      Cardiac Enzymes No results for input(s): CPK, CKND1, MANAV in the last 72 hours.     No lab exists for component: Sade Kika Coagulation No results for input(s): PTP, INR, APTT in the last 72 hours. No lab exists for component: INREXT, INREXT    Lipid Panel No results found for: CHOL, CHOLPOCT, CHOLX, CHLST, CHOLV, 408299, HDL, LDL, LDLC, DLDLP, 419358, VLDLC, VLDL, TGLX, TRIGL, TRIGP, TGLPOCT, CHHD, CHHDX   BNP No results for input(s): BNPP in the last 72 hours. Liver Enzymes Recent Labs      02/18/18   0117   TP  6.4   ALB  3.1*   AP  117   SGOT  27      Thyroid Studies Lab Results   Component Value Date/Time    TSH 0.75 02/14/2018 03:47 PM          Procedures/imaging: see electronic medical records for all procedures/Xrays and details which were not copied into this note but were reviewed prior to creation of Plan    Medications:   Current Facility-Administered Medications   Medication Dose Route Frequency    docusate sodium (COLACE) capsule 100 mg  100 mg Oral BID    furosemide (LASIX) tablet 20 mg  20 mg Oral DAILY    potassium chloride (KLOR-CON) tablet 10 mEq  10 mEq Oral DAILY    calcium-vitamin D (OS-JOSH) 500 mg-200 unit tablet  1 Tab Oral DAILY WITH BREAKFAST    therapeutic multivitamin (THERAGRAN) tablet 1 Tab  1 Tab Oral DAILY    vits A and D-white pet-lanolin (A&D) ointment   Topical BID       Assessment/Plan     Active Problems:    Shortness of breath (2/14/2018)      Weakness of both legs (2/14/2018)      Craniostenosis (2/14/2018)      Peripheral edema (2/14/2018)        Shortness of breath  -Resolved     Leg Weakness/Non-ambulatory  -[facility] lift  (awaiting approval)  - discussed with case manger she will not be able to get over the weekend     Peripheral Edema  -Monitor  -Lasix  - venous duplex ultrasound B/L leg      Pressure ulcer of R heel, Wound on post thigh  -Friction injury to thigh has almost closed. -Scar on R heel from previous injury.  Monitor    - f/u venous duplex ultrasound leg discussed with family at bed side  -pt and ot evaluation and treatment   - cbc, cmp, mag     Severiano Smith, MD  2/18/2018 10:04 AM

## 2018-02-18 NOTE — PROGRESS NOTES
Problem: Mobility Impaired (Adult and Pediatric)  Goal: *Acute Goals and Plan of Care (Insert Text)  Physical Therapy Goals  Initiated 2/16/2018 and to be accomplished within 5 day(s)  1. Patient will move from supine to sit and sit to supine , scoot up and down and roll side to side in bed with moderate assistance . 2.  Patient will improve sitting balance to fair+ while performing LE exercises. 3.  Patient /family training on HEP for LE, positioning       physical Therapy TREATMENT/DISCHARGE    Patient: Yumi Youssef (13 y.o. female)  Date: 2/18/2018  Diagnosis: Shortness of breath <principal problem not specified>       Precautions: Fall, Skin  Chart, physical therapy assessment, plan of care and goals were reviewed. ASSESSMENT:  Pt cleared by RN to participate in session. Pt received semi-reclined in bed and agreeable to PT session. Pt's sister and family at bedside, sister present during session. Pt has met all goals. Has hospital bed at home and is able to perform supine to sit with elevated HOB with Adam, can roll from supine to sidelying with Adam and use of bedrails. Pt completed sitting balance activities without LOB and completed LE exercises of ankle pumps, LAQ and marching without LOB. Preference to sit with UE support though able to sit without UE or LE support. Sitting with posterior COM with increased trunk flexion though no LOB. Pt's sister reported they are just waiting on a [facility] lift to be delivered to ease caregiver burden and to ensure decreased risk for falls. Pt and sister at baseline at this time. Progression toward goals:  [x]      Goals met  []      Improving appropriately and progressing toward goals  []      Improving slowly and progressing toward goals  []      Not making progress toward goals and plan of care will be adjusted     PLAN:  Patient will be discharged from physical therapy at this time.   Rationale for discharge:  [x] Goals Achieved  [] [address]  [] Patient not participating in therapy  [] Other:  Discharge Recommendations:  Home with 24 hour assist  Further Equipment Recommendations for Discharge:  Has hospital bed and w/c     SUBJECTIVE:   Patient stated I used to be able to get to bedside commode by myself.     OBJECTIVE DATA SUMMARY:   Critical Behavior:  Neurologic State: Alert, Eyes open spontaneously  Orientation Level: Oriented X4  Cognition: Appropriate decision making  Safety/Judgement: Awareness of environment, Fall prevention  Functional Mobility Training:  Bed Mobility:  Rolling: Minimum assistance (with use of bedrails)  Supine to Sit: Modified independent (with HOB elevated (has hospital bed at home))  Sit to Supine: Modified independent (use of bedrails)  Scooting: Total assistance (unable to complete with BUE pull and bridge)         Transfers:                                   Balance:  Sitting: Impaired  Sitting - Static: Unsupported;Good (unsupported)  Sitting - Dynamic: Fair (occasional)  Standing:  (did not attempt)  Ambulation/Gait Training:              Gait Description (WDL):  (did not attempt )                                         Stairs: Therapeutic Exercises:   Instructed in and completed x20 reps bilaterally of ankle pumps, LAQ and marching  Pain:  Pain Scale 1: Numeric (0 - 10)  Pain Intensity 1: 0              Activity Tolerance:   Good activity tolerance with no complaints of fatigue, mobility limited due to LE strength and balance impairments. Please refer to the flowsheet for vital signs taken during this treatment. After treatment:   [] Patient left in no apparent distress sitting up in chair  [x] Patient left in no apparent distress in bed  [x] Call bell left within reach  [] Nursing notified  [x] Caregiver present  [] Bed alarm activated  Ariane Bright, PT   Time Calculation: 32 mins     Mobility  D/C  CM= 80-99%.   The severity rating is based on the Level of Assistance required for Functional Mobility and ADLs.

## 2018-02-18 NOTE — PROGRESS NOTES
New OT orders received and chart reviewed. Patient was evaluated on 2/16/18 and on OT caseload. New OT order will be acknowledged.  Thank you for the referral.     Isatu Carrasquillo OTR/L

## 2018-02-18 NOTE — ROUTINE PROCESS
Bedside and Verbal shift change report given to Daisha Fields RN (oncoming nurse) by [employer], RN (offgoing nurse). Report included the following information SBAR and Kardex.

## 2018-02-18 NOTE — PROGRESS NOTES
New OT orders seen and acknowledged. Pt evaluated 2/16/18, and currently on OT caseload.  Thank you for your referral.    Alan Campbell, PTA

## 2018-02-19 ENCOUNTER — HOME HEALTH ADMISSION (OUTPATIENT)
Dept: HOME HEALTH SERVICES | Facility: HOME HEALTH | Age: 53
End: 2018-02-19
Payer: MEDICARE

## 2018-02-19 VITALS
WEIGHT: 280.5 LBS | TEMPERATURE: 97.9 F | RESPIRATION RATE: 18 BRPM | HEART RATE: 103 BPM | SYSTOLIC BLOOD PRESSURE: 128 MMHG | DIASTOLIC BLOOD PRESSURE: 78 MMHG | BODY MASS INDEX: 47.89 KG/M2 | HEIGHT: 64 IN | OXYGEN SATURATION: 95 %

## 2018-02-19 LAB
ALBUMIN SERPL-MCNC: 3.2 G/DL (ref 3.4–5)
ALBUMIN/GLOB SERPL: 0.9 {RATIO} (ref 0.8–1.7)
ALP SERPL-CCNC: 122 U/L (ref 45–117)
ALT SERPL-CCNC: 26 U/L (ref 13–56)
ANION GAP SERPL CALC-SCNC: 7 MMOL/L (ref 3–18)
AST SERPL-CCNC: 23 U/L (ref 15–37)
BASOPHILS # BLD: 0 K/UL (ref 0–0.1)
BASOPHILS NFR BLD: 0 % (ref 0–2)
BILIRUB SERPL-MCNC: 0.6 MG/DL (ref 0.2–1)
BUN SERPL-MCNC: 17 MG/DL (ref 7–18)
BUN/CREAT SERPL: 30 (ref 12–20)
CALCIUM SERPL-MCNC: 8.3 MG/DL (ref 8.5–10.1)
CHLORIDE SERPL-SCNC: 105 MMOL/L (ref 100–108)
CO2 SERPL-SCNC: 30 MMOL/L (ref 21–32)
CREAT SERPL-MCNC: 0.57 MG/DL (ref 0.6–1.3)
DIFFERENTIAL METHOD BLD: ABNORMAL
EOSINOPHIL # BLD: 0.4 K/UL (ref 0–0.4)
EOSINOPHIL NFR BLD: 9 % (ref 0–5)
ERYTHROCYTE [DISTWIDTH] IN BLOOD BY AUTOMATED COUNT: 14.2 % (ref 11.6–14.5)
GLOBULIN SER CALC-MCNC: 3.5 G/DL (ref 2–4)
GLUCOSE SERPL-MCNC: 98 MG/DL (ref 74–99)
HCT VFR BLD AUTO: 42.7 % (ref 35–45)
HGB BLD-MCNC: 13.4 G/DL (ref 12–16)
LYMPHOCYTES # BLD: 1.6 K/UL (ref 0.9–3.6)
LYMPHOCYTES NFR BLD: 40 % (ref 21–52)
MAGNESIUM SERPL-MCNC: 1.9 MG/DL (ref 1.6–2.6)
MCH RBC QN AUTO: 27.1 PG (ref 24–34)
MCHC RBC AUTO-ENTMCNC: 31.4 G/DL (ref 31–37)
MCV RBC AUTO: 86.4 FL (ref 74–97)
MONOCYTES # BLD: 0.3 K/UL (ref 0.05–1.2)
MONOCYTES NFR BLD: 7 % (ref 3–10)
NEUTS SEG # BLD: 1.8 K/UL (ref 1.8–8)
NEUTS SEG NFR BLD: 44 % (ref 40–73)
PLATELET # BLD AUTO: 184 K/UL (ref 135–420)
PMV BLD AUTO: 9.9 FL (ref 9.2–11.8)
POTASSIUM SERPL-SCNC: 4 MMOL/L (ref 3.5–5.5)
PROT SERPL-MCNC: 6.7 G/DL (ref 6.4–8.2)
RBC # BLD AUTO: 4.94 M/UL (ref 4.2–5.3)
SODIUM SERPL-SCNC: 142 MMOL/L (ref 136–145)
WBC # BLD AUTO: 4.1 K/UL (ref 4.6–13.2)

## 2018-02-19 PROCEDURE — 83735 ASSAY OF MAGNESIUM: CPT | Performed by: FAMILY MEDICINE

## 2018-02-19 PROCEDURE — 36415 COLL VENOUS BLD VENIPUNCTURE: CPT | Performed by: FAMILY MEDICINE

## 2018-02-19 PROCEDURE — 74011250637 HC RX REV CODE- 250/637: Performed by: FAMILY MEDICINE

## 2018-02-19 PROCEDURE — 85025 COMPLETE CBC W/AUTO DIFF WBC: CPT | Performed by: FAMILY MEDICINE

## 2018-02-19 PROCEDURE — 93970 EXTREMITY STUDY: CPT

## 2018-02-19 PROCEDURE — 80053 COMPREHEN METABOLIC PANEL: CPT | Performed by: FAMILY MEDICINE

## 2018-02-19 RX ADMIN — VITAMIN A AND VITAMIN D: 929.3 OINTMENT TOPICAL at 09:00

## 2018-02-19 RX ADMIN — DOCUSATE SODIUM 100 MG: 100 CAPSULE, LIQUID FILLED ORAL at 09:05

## 2018-02-19 RX ADMIN — POTASSIUM CHLORIDE 10 MEQ: 10 TABLET, EXTENDED RELEASE ORAL at 09:05

## 2018-02-19 RX ADMIN — THERA TABS 1 TABLET: TAB at 09:05

## 2018-02-19 RX ADMIN — FUROSEMIDE 20 MG: 20 TABLET ORAL at 09:05

## 2018-02-19 RX ADMIN — OYSTER SHELL CALCIUM WITH VITAMIN D 1 TABLET: 500; 200 TABLET, FILM COATED ORAL at 09:05

## 2018-02-19 NOTE — HOME CARE
Received HH referral, Discharge noted for today, [facility] will follow for PT, pt was ordered a [address] by  [id number]) ,being provided thru [facility] to be delivered to pt's home, spoke to pt's sister Yovana Johnston states that pt has PCA from [facility] Mon-Fri from 8am-2pm and 5pm-8pm ,she also states that she ,her father , brother and 2 other sisters all take turns talking care of pt ,but that she and her brother Checo Gill ,[phone]) are the main point of [address] will follow for PT. JOLEEN BAZZI.

## 2018-02-19 NOTE — PROGRESS NOTES
Bedside shift change report given to JAK Fontaine RN (oncoming nurse) by Rahel Esparza RN (offgoing nurse). Report included the following information SBAR, Kardex, Intake/Output, MAR, Recent Results and Med Rec Status.

## 2018-02-19 NOTE — DISCHARGE INSTRUCTIONS
Learning About Craniosynostosis in Newborns  What is craniosynostosis? Craniosynostosis (say \"qmko-pzv-et-sgx-ylvj-DLK-corey\") is a problem with the skull. The soft areas between the plates of the baby's skull are called sutures. The sutures usually start to fuse together after a child is 3years of age. With craniosynostosis, one or more of the sutures fuses too soon. This can keep the skull from expanding as the baby grows. In severe cases, it can cause pressure on the brain. This is a congenital condition. This means your baby was born with it. If there's a lot of pressure on your baby's brain, surgery may be needed right away to relieve the pressure. Your child will be asleep during surgery. Your baby may need special care, such as being in the  intensive care unit (NICU). This may be scary for you. But the hospital staff understands this. They will explain what happens and will answer your questions. What can you expect? · You may see tubes and wires attached to your baby. This can be scary to see. But these things help the doctor treat your baby. The tubes supply air, fluid, and medicines to your baby. The wires are attached to machines that help the doctor keep track of your baby's vital signs. These include temperature, blood pressure, breathing rate, and pulse rate. · If your baby has trouble breathing, the doctor may use a ventilator. This machine helps your baby breathe. To do this, the doctor puts a soft tube through your baby's mouth into the windpipe. · The hospital staff will give your baby the nutrition he or she needs. The doctor may feed your baby through a soft tube that goes through the nose and into the stomach. Or the doctor may use an IV that goes through the belly button to do this. · Your baby will be kept comfortable and warm. · It may seem that your baby is getting lots of tests.  All of these tests help your doctor keep track of your baby's condition and give the best treatment possible. · It's hard to be apart from your baby, especially when you worry about his or her condition. Know that the hospital staff is well prepared to care for babies with this condition. They will do everything they can to help. If you need it, get support from friends and family. Ask the hospital staff about counseling and support. Where can you learn more? Go to http://andrzej-anjali.info/. Enter P103 in the search box to learn more about \"Learning About Craniosynostosis in Newborns. \"  Current as of: May 12, 2017  Content Version: 11.4  © 3169-7582 [employer]. Care instructions adapted under license by [employer] (which disclaims liability or warranty for this information). If you have questions about a medical condition or this instruction, always ask your healthcare professional. [address] any warranty or liability for your use of this information. Leg and Ankle Edema: Care Instructions  Your Care Instructions  Swelling in the legs, ankles, and feet is called edema. It is common after you sit or stand for a while. Long plane flights or car rides often cause swelling in the legs and feet. You may also have swelling if you have to stand for long periods of time at your job. Problems with the veins in the legs (varicose veins) and changes in hormones can also cause swelling. Sometimes the swelling in the ankles and feet is caused by a more serious problem, such as heart failure, infection, blood clots, or liver or kidney disease. Follow-up care is a key part of your treatment and safety. Be sure to make and go to all appointments, and call your doctor if you are having problems. It's also a good idea to know your test results and keep a list of the medicines you take. How can you care for yourself at home? · If your doctor gave you medicine, take it as prescribed.  Call your doctor if you think you are having a problem with your medicine. · Whenever you are resting, raise your legs up. Try to keep the swollen area higher than the level of your heart. · Take breaks from standing or sitting in one position. ¨ Walk around to increase the blood flow in your lower legs. ¨ Move your feet and ankles often while you stand, or tighten and relax your leg muscles. · Wear support stockings. Put them on in the morning, before swelling gets worse. · Eat a balanced diet. Lose weight if you need to. · Limit the amount of salt (sodium) in your diet. Salt holds fluid in the body and may increase swelling. When should you call for help? Call 911 anytime you think you may need emergency care. For example, call if:  ? · You have symptoms of a blood clot in your lung (called a pulmonary embolism). These may include:  ¨ Sudden chest pain. ¨ Trouble breathing. ¨ Coughing up blood. ?Call your doctor now or seek immediate medical care if:  ? · You have signs of a blood clot, such as:  ¨ Pain in your calf, back of the knee, thigh, or groin. ¨ Redness and swelling in your leg or groin. ? · You have symptoms of infection, such as:  ¨ Increased pain, swelling, warmth, or redness. ¨ Red streaks or pus. ¨ A fever. ? Watch closely for changes in your health, and be sure to contact your doctor if:  ? · Your swelling is getting worse. ? · You have new or worsening pain in your legs. ? · You do not get better as expected. Where can you learn more? Go to http://andrzej-anjali.info/. Enter I042 in the search box to learn more about \"Leg and Ankle Edema: Care Instructions. \"  Current as of: March 20, 2017  Content Version: 11.4  © 7046-2474 [employer]. Care instructions adapted under license by [employer] (which disclaims liability or warranty for this information).  If you have questions about a medical condition or this instruction, always ask your healthcare professional. [employer] disclaims any warranty or liability for your use of this information.

## 2018-02-19 NOTE — PROGRESS NOTES
Spoke with [facility] Liaison,  who reports that this pt's Verdie  has been sent to [facility], who reports that they treat the [facility] like a bed request and [facility] [place] can be delivered on Wednesday 2/21/18. Family was informed that this was the case and the company has contacted family as well to report this plan. Family was asked if this will now support this pt being returned home with personal care and family support services. Dr. Joslyn Hill was called and informed that the [place] will be delivered to the home no later than Wednesday, and that family is agreeable with this plan of discharge today. Family has requested medical transport for this pt to home. This writer will call [facility] for a transport time 5:00 p.m. Gen Campbell

## 2018-02-19 NOTE — DISCHARGE SUMMARY
Discharge Summary     Patient: Keisha Marte MRN: [id number]  SSN: [id number]    YOB: 1965  Age: 50 y.o. Sex: female       Admit Date: 2/14/2018    Discharge Date: 2/19/2018      Admission Diagnoses: Shortness of breath    Discharge Diagnoses:   Problem List as of 2/19/2018  Date Reviewed: 2/14/2018          Codes Class Noted - Resolved    Shortness of breath ICD-10-CM: R06.02  ICD-9-CM: 786.05  2/14/2018 - Present        Weakness of both legs ICD-10-CM: R29.898  ICD-9-CM: 729.89  2/14/2018 - Present        Craniostenosis ICD-10-CM: Q75.0  ICD-9-CM: 756.0  2/14/2018 - Present        Peripheral edema ICD-10-CM: R60.9  ICD-9-CM: 782.3  2/14/2018 - Present        Follow-up exam, less than 3 months since previous exam, DEXA scan ICD-10-CM: Z09  ICD-9-CM: V67.9  Unknown - Present               Discharge Condition: Stable    Hospital Course: Patient with craniosynestosis admitted due to weakness with SOB and increased edema as well as increased weakness. Lab work, ECHO and head CT unremarkable. Edema improved with lasix. Decisionto have patient go back home but family needs [employer] lift as patient not presently able to ambulate. Consults: None    Significant Diagnostic Studies: radiology: CT scan: negative for hydrocephalus and cardiac graphics: Echocardiogram: EF 60%    Disposition: home    Discharge Medications:   Current Discharge Medication List      CONTINUE these medications which have NOT CHANGED    Details   potassium chloride (K-DUR, KLOR-CON) 20 mEq tablet Take 20 mEq by mouth daily. furosemide (LASIX) 40 mg tablet Take 40 mg by mouth daily. multivitamin (ONE A DAY) tablet Take 1 Tab by mouth daily. calcium-vitamin D (OYSTER SHELL) 500 mg(1,250mg) -200 unit per tablet Take 1 Tab by mouth two (2) times daily (with meals).              Activity: Activity as tolerated  Diet: Regular Diet  Wound Care: None needed    Follow-up Appointments   Procedures    FOLLOW UP VISIT Appointment in: One Week     Standing Status:   Standing     Number of Occurrences:   1     Order Specific Question:   Appointment in     Answer: One Week       Signed By: Almetta Mcardle, MD     February 19, 2018    Sob from edema and deconditioning and ataectasis.

## 2018-02-19 NOTE — PROCEDURES
[facility]  *** FINAL REPORT ***    Name: Francisca Otero  MRN: [id number]    Inpatient  : 19 Dec 1965  HIS Order #: [id number]  [id number] [facility] Visit #: [id number]  Date: 2018    TYPE OF TEST: Peripheral Venous Testing    REASON FOR TEST  Limb swelling, Shortness of breath    Right Leg:-  Deep venous thrombosis:           No  Superficial venous thrombosis:    No  Deep venous insufficiency:        Not examined  Superficial venous insufficiency: Not examined    Left Leg:-  Deep venous thrombosis:           No  Superficial venous thrombosis:    No  Deep venous insufficiency:        Not examined  Superficial venous insufficiency: Not examined      INTERPRETATION/FINDINGS  Duplex images were obtained using 2-D gray scale, color flow, and  spectral Doppler analysis. Right leg :  1. Deep veins visualized include the common femoral, femoral,  popliteal, posterior tibial and peroneal veins. 2. No evidence of deep venous thrombosis detected in the veins  visualized. 3. Superficial veins visualized include the great saphenous vein. 4. No evidence of superficial thrombosis detected. 5. Normal multiphasic flow in the posterior tibial artery. Left leg :  1. Deep veins visualized include the common femoral, femoral,  popliteal, posterior tibial and peroneal veins. 2. No evidence of deep venous thrombosis detected in the veins  visualized. 3. Superficial veins visualized include the great saphenous vein. 4. No evidence of superficial thrombosis detected. 5. Normal multiphasic flow in the posterior tibial artery. ADDITIONAL COMMENTS    I have personally reviewed the data relevant to the interpretation of  this  study. TECHNOLOGIST: AMANDEEP Salazar RVS  Signed: 2018 02:08 PM    PHYSICIAN: Roverto Carreon D.O.   Signed: 2018 09:11 AM

## 2018-02-19 NOTE — PROGRESS NOTES
Care Management Interventions  PCP Verified by CM: Yes (DR. Domenico Kamara)  Palliative Care Criteria Met (RRAT>21 & CHF Dx)?: No  Mode of Transport at Discharge: BLS  Transition of Care Consult (CM Consult): [address]: Yes  Discharge Durable Medical Equipment: Yes ([place] lift)  Current Support Network: Relative's Home, Family Lives Nearby, Has Personal Caregivers ([address])  Confirm Follow Up Transport: Other (see comment)  Plan discussed with Pt/Family/Caregiver: Yes (PT'S FATHER IS REQUESTING LONG TERM PLACEMENT, FATHER LATONYA CANNONXIMME-[phone], DOES NOT FEEL HE CAN TAKE CARE OF PT AT [address] [facility] Tiara Diaz)  [place] of Choice Offered: Yes  [address] Provided?: No  Discharge Location  Discharge Placement: Home with home health   [address] provided for this family who chose Home health with [facility], who reports that they can only provide pt services for this pt. Referral placed in this pt's que.

## 2018-02-19 NOTE — ROUTINE PROCESS
Bedside and Verbal shift change report given to Bridger Dupree RN (oncoming nurse) by Joey Cifuentes RN (offgoing nurse). Report included the following information SBAR, Kardex, MAR and Recent Results.     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  ICD-9-CM: 756.0  2/14/2018 Unknown        Peripheral edema ICD-10-CM: R60.9  ICD-9-CM: 782.3  2/14/2018 Unknown              BACKGROUND:   Past Medical History:   Past Medical History:   Diagnosis Date    Cerebral palsy ([address]     Hematoma     left tibia    Osteoarthritis of right knee     Osteoporosis     diffuse    Pressure ulcer of heel     right    Scoliosis     thoracolumar    Sprain     right lower leg and ankle      Patient taking anticoagulants no    Patient has a defibrillator: no    History of shots YES for example, flu, pneumonia, tetanus   Isolation History NO for example, MRSA, CDiff    ASSESSMENT:  Changes in Assessment Throughout Shift: None  Significant Changes in 24 hours (for example, RR/code, fall)  Patient has Central Line: no Reasons if yes: N/A  Patient has Klein Cath: no Reasons if yes: N/A   Mobility Issues  PT  IV Patency  OR Checklist  Pending Tests    Last Vitals:  Vitals w/ MEWS Score (last day)     Date/Time MEWS Score Pulse Resp Temp BP Level of Consciousness SpO2    02/19/18 0352 1 87 18 97.9 °F (36.6 °C) 117/72 Alert 99 %    02/19/18 0011 1 89 17 97.5 °F (36.4 °C) 112/74 Alert 97 %    02/18/18 2000 1 91 18 98.3 °F (36.8 °C) 105/79 Alert 97 %    02/18/18 1729 1 94 20 98 °F (36.7 °C) 105/78 Alert 95 %    02/18/18 1231 1 91 18 97 °F (36.1 °C) 109/70 Alert 96 %    02/18/18 0850 1 98 20 96.9 °F (36.1 °C) 137/82 Alert 95 %    02/18/18 0803 -- -- -- -- -- Alert --    02/18/18 0400 1 87 18 97.5 °F (36.4 °C) 117/74 Alert 96 %    02/18/18 0000 1 88 18 97.6 °F (36.4 °C) 110/76 Alert 94 %            PAIN    Pain Assessment    Pain Intensity 1: 0 (02/19/18 0352)              Patient Stated Pain Goal: 0  Intervention effective: N/A  Time of last intervention: N/A Reassessment Completed: N/A  Other actions taken for pain: N/A    Last 3 Weights:  Last 3 Recorded Weights in this Encounter    02/16/18 1117   Weight: 127.2 kg (280 lb 8 oz)   Weight change:     INTAKE/OUPUT    Current Shift:      Last three shifts: 02/17 0701 - 02/18 1900  In: 925 [P.O.:925]  Out: 2950 [Urine:2950]    RECOMMENDATIONS AND DISCHARGE PLANNING  Patient needs and requests: None    Pending tests/procedures: None     Discharge plan for patient: Home with home health    Discharge planning Needs or Barriers: waiting on [place] lift to be delivered home    Estimated Discharge Date: TBD Posted on Whiteboard in Patients Room: no       \"HEALS\" SAFETY CHECK  A safety check occurred in the patient's room between off going nurse and oncoming nurse listed above. The safety check included the below items:    H  High Alert Medications Verify all high alert medication drips (heparin, PCA, etc.)  E  Equipment Suction is set up for ALL patients (with yanker)  Red plugs utilized for all equipment (IV pumps, etc.)  WOWs wiped down at end of shift. Room stocked with oxygen, suction, and other unit-specific supplies  A  Alarms Bed alarm is set for fall risk patients  Ensure chair alarm is in place and activated if patient is up in a chair  L  Lines Check IV for any infiltration  Klein bag is empty if patient has a Klein   Tubing and IV bags are labeled  S  Safety  Room is clean, patient is clean, and equipment is clean. Hallways are clear from equipment besides carts.    Fall bracelet on for fall risk patients  Ensure room is clear and free of clutter  Suction is set up for ALL patients (with constantineker)  Hallways are clear from equipment besides carts.    Isolation precautions followed, supplies available outside room, sign posted    Rafael Gordon RN

## 2018-02-20 NOTE — CDMP QUERY
Pt admitted with SOB and edema. Please clarify a diagnosis for the symptoms. Please clarify if this patient is being treated/managed for:    =>  => Other Explanation of clinical findings  => Unable to Determine (no explanation of clinical findings)    The medical record reflects the following:  Risk:  -- PMH cerebral palsy    Clinical Indicators:  -- 2/18 pn:  presented to ED on 2/14 c/o SOB, edema and increased weakness. CXR on 2/14 showed hypoinflation. Admitted for edema eval and SOB. Venous duplex ultrasound leg pending pt had surgery Rt leg in the past has more swelling Rt leg than Lt    Treatment:   -- given Lasix    Please clarify and document your clinical opinion in the progress notes and discharge summary including the definitive and/or presumptive diagnosis, (suspected or probable), related to the above clinical findings. Please include clinical findings supporting your diagnosis. If you DECLINE this query or would like to communicate with [facility], please utilize the \"[place] message box\" at the TOP of the Progress Note on the right.       Thank you,  Al [facility], [address] [place] [address]

## 2018-02-21 ENCOUNTER — HOME CARE VISIT (OUTPATIENT)
Dept: SCHEDULING | Facility: HOME HEALTH | Age: 53
End: 2018-02-21
Payer: MEDICARE

## 2018-02-21 VITALS
SYSTOLIC BLOOD PRESSURE: 130 MMHG | DIASTOLIC BLOOD PRESSURE: 92 MMHG | TEMPERATURE: 98.3 F | OXYGEN SATURATION: 92 % | HEART RATE: 96 BPM

## 2018-02-21 PROCEDURE — G0151 HHCP-SERV OF PT,EA 15 MIN: HCPCS

## 2018-02-21 PROCEDURE — 3331090001 HH PPS REVENUE CREDIT

## 2018-02-21 PROCEDURE — 3331090002 HH PPS REVENUE DEBIT

## 2018-02-21 PROCEDURE — 400013 HH SOC

## 2018-02-22 ENCOUNTER — HOME CARE VISIT (OUTPATIENT)
Dept: HOME HEALTH SERVICES | Facility: HOME HEALTH | Age: 53
End: 2018-02-22
Payer: MEDICARE

## 2018-02-22 PROCEDURE — 3331090001 HH PPS REVENUE CREDIT

## 2018-02-22 PROCEDURE — 3331090002 HH PPS REVENUE DEBIT

## 2018-02-23 ENCOUNTER — HOME CARE VISIT (OUTPATIENT)
Dept: SCHEDULING | Facility: HOME HEALTH | Age: 53
End: 2018-02-23
Payer: MEDICARE

## 2018-02-23 PROCEDURE — G0157 HHC PT ASSISTANT EA 15: HCPCS

## 2018-02-23 PROCEDURE — 3331090001 HH PPS REVENUE CREDIT

## 2018-02-23 PROCEDURE — 3331090002 HH PPS REVENUE DEBIT

## 2018-02-24 PROCEDURE — 3331090002 HH PPS REVENUE DEBIT

## 2018-02-24 PROCEDURE — 3331090001 HH PPS REVENUE CREDIT

## 2018-02-25 PROCEDURE — 3331090001 HH PPS REVENUE CREDIT

## 2018-02-25 PROCEDURE — 3331090002 HH PPS REVENUE DEBIT

## 2018-02-26 PROCEDURE — 3331090001 HH PPS REVENUE CREDIT

## 2018-02-26 PROCEDURE — 3331090002 HH PPS REVENUE DEBIT

## 2018-02-27 ENCOUNTER — HOME CARE VISIT (OUTPATIENT)
Dept: SCHEDULING | Facility: HOME HEALTH | Age: 53
End: 2018-02-27
Payer: MEDICARE

## 2018-02-27 VITALS
HEART RATE: 98 BPM | DIASTOLIC BLOOD PRESSURE: 64 MMHG | SYSTOLIC BLOOD PRESSURE: 116 MMHG | OXYGEN SATURATION: 98 % | TEMPERATURE: 98.2 F

## 2018-02-27 PROCEDURE — 3331090002 HH PPS REVENUE DEBIT

## 2018-02-27 PROCEDURE — G0157 HHC PT ASSISTANT EA 15: HCPCS

## 2018-02-27 PROCEDURE — 3331090001 HH PPS REVENUE CREDIT

## 2018-02-28 VITALS
HEART RATE: 100 BPM | SYSTOLIC BLOOD PRESSURE: 118 MMHG | DIASTOLIC BLOOD PRESSURE: 62 MMHG | TEMPERATURE: 98.1 F | OXYGEN SATURATION: 94 %

## 2018-02-28 PROCEDURE — 3331090001 HH PPS REVENUE CREDIT

## 2018-02-28 PROCEDURE — 3331090002 HH PPS REVENUE DEBIT

## 2018-03-01 ENCOUNTER — HOME CARE VISIT (OUTPATIENT)
Dept: SCHEDULING | Facility: HOME HEALTH | Age: 53
End: 2018-03-01
Payer: MEDICARE

## 2018-03-01 PROCEDURE — G0157 HHC PT ASSISTANT EA 15: HCPCS

## 2018-03-01 PROCEDURE — 3331090001 HH PPS REVENUE CREDIT

## 2018-03-01 PROCEDURE — 3331090002 HH PPS REVENUE DEBIT

## 2018-03-02 PROCEDURE — 3331090002 HH PPS REVENUE DEBIT

## 2018-03-02 PROCEDURE — 3331090001 HH PPS REVENUE CREDIT

## 2018-03-03 PROCEDURE — 3331090001 HH PPS REVENUE CREDIT

## 2018-03-03 PROCEDURE — 3331090002 HH PPS REVENUE DEBIT

## 2018-03-04 VITALS
OXYGEN SATURATION: 96 % | DIASTOLIC BLOOD PRESSURE: 60 MMHG | SYSTOLIC BLOOD PRESSURE: 116 MMHG | TEMPERATURE: 97.8 F | HEART RATE: 88 BPM

## 2018-03-04 PROCEDURE — 3331090001 HH PPS REVENUE CREDIT

## 2018-03-04 PROCEDURE — 3331090002 HH PPS REVENUE DEBIT

## 2018-03-05 PROCEDURE — 3331090001 HH PPS REVENUE CREDIT

## 2018-03-05 PROCEDURE — 3331090002 HH PPS REVENUE DEBIT

## 2018-03-06 ENCOUNTER — HOME CARE VISIT (OUTPATIENT)
Dept: SCHEDULING | Facility: HOME HEALTH | Age: 53
End: 2018-03-06
Payer: MEDICARE

## 2018-03-06 PROCEDURE — 3331090001 HH PPS REVENUE CREDIT

## 2018-03-06 PROCEDURE — 3331090002 HH PPS REVENUE DEBIT

## 2018-03-06 PROCEDURE — G0157 HHC PT ASSISTANT EA 15: HCPCS

## 2018-03-07 PROCEDURE — 3331090002 HH PPS REVENUE DEBIT

## 2018-03-07 PROCEDURE — 3331090001 HH PPS REVENUE CREDIT

## 2018-03-08 ENCOUNTER — HOME CARE VISIT (OUTPATIENT)
Dept: SCHEDULING | Facility: HOME HEALTH | Age: 53
End: 2018-03-08
Payer: MEDICARE

## 2018-03-08 VITALS
TEMPERATURE: 98.1 F | DIASTOLIC BLOOD PRESSURE: 78 MMHG | SYSTOLIC BLOOD PRESSURE: 122 MMHG | OXYGEN SATURATION: 98 % | HEART RATE: 93 BPM

## 2018-03-08 PROCEDURE — G0151 HHCP-SERV OF PT,EA 15 MIN: HCPCS

## 2018-03-08 PROCEDURE — 3331090001 HH PPS REVENUE CREDIT

## 2018-03-08 PROCEDURE — 3331090002 HH PPS REVENUE DEBIT

## 2018-03-09 VITALS
OXYGEN SATURATION: 98 % | SYSTOLIC BLOOD PRESSURE: 142 MMHG | HEART RATE: 60 BPM | DIASTOLIC BLOOD PRESSURE: 68 MMHG | TEMPERATURE: 98.2 F

## 2018-03-09 PROCEDURE — 3331090001 HH PPS REVENUE CREDIT

## 2018-03-09 PROCEDURE — 3331090002 HH PPS REVENUE DEBIT

## 2018-03-10 PROCEDURE — 3331090002 HH PPS REVENUE DEBIT

## 2018-03-10 PROCEDURE — 3331090001 HH PPS REVENUE CREDIT

## 2018-03-11 PROCEDURE — 3331090002 HH PPS REVENUE DEBIT

## 2018-03-11 PROCEDURE — 3331090001 HH PPS REVENUE CREDIT

## 2018-03-12 PROCEDURE — 3331090001 HH PPS REVENUE CREDIT

## 2018-03-12 PROCEDURE — 3331090002 HH PPS REVENUE DEBIT

## 2018-03-13 ENCOUNTER — HOME CARE VISIT (OUTPATIENT)
Dept: SCHEDULING | Facility: HOME HEALTH | Age: 53
End: 2018-03-13
Payer: MEDICARE

## 2018-03-13 PROCEDURE — 3331090001 HH PPS REVENUE CREDIT

## 2018-03-13 PROCEDURE — G0157 HHC PT ASSISTANT EA 15: HCPCS

## 2018-03-13 PROCEDURE — 3331090002 HH PPS REVENUE DEBIT

## 2018-03-14 PROCEDURE — 3331090001 HH PPS REVENUE CREDIT

## 2018-03-14 PROCEDURE — 3331090002 HH PPS REVENUE DEBIT

## 2018-03-15 ENCOUNTER — HOME CARE VISIT (OUTPATIENT)
Dept: SCHEDULING | Facility: HOME HEALTH | Age: 53
End: 2018-03-15
Payer: MEDICARE

## 2018-03-15 PROCEDURE — G0157 HHC PT ASSISTANT EA 15: HCPCS

## 2018-03-15 PROCEDURE — 3331090001 HH PPS REVENUE CREDIT

## 2018-03-15 PROCEDURE — 3331090002 HH PPS REVENUE DEBIT

## 2018-03-16 PROCEDURE — 3331090001 HH PPS REVENUE CREDIT

## 2018-03-16 PROCEDURE — 3331090002 HH PPS REVENUE DEBIT

## 2018-03-17 PROCEDURE — 3331090002 HH PPS REVENUE DEBIT

## 2018-03-17 PROCEDURE — 3331090001 HH PPS REVENUE CREDIT

## 2018-03-18 VITALS
SYSTOLIC BLOOD PRESSURE: 118 MMHG | DIASTOLIC BLOOD PRESSURE: 68 MMHG | OXYGEN SATURATION: 98 % | TEMPERATURE: 98 F | TEMPERATURE: 98.2 F | DIASTOLIC BLOOD PRESSURE: 72 MMHG | HEART RATE: 70 BPM | SYSTOLIC BLOOD PRESSURE: 120 MMHG | HEART RATE: 72 BPM | OXYGEN SATURATION: 98 %

## 2018-03-18 PROCEDURE — 3331090002 HH PPS REVENUE DEBIT

## 2018-03-18 PROCEDURE — 3331090001 HH PPS REVENUE CREDIT

## 2018-03-19 PROCEDURE — 3331090001 HH PPS REVENUE CREDIT

## 2018-03-19 PROCEDURE — 3331090002 HH PPS REVENUE DEBIT

## 2018-03-20 ENCOUNTER — HOME CARE VISIT (OUTPATIENT)
Dept: SCHEDULING | Facility: HOME HEALTH | Age: 53
End: 2018-03-20
Payer: MEDICARE

## 2018-03-20 PROCEDURE — 3331090002 HH PPS REVENUE DEBIT

## 2018-03-20 PROCEDURE — G0157 HHC PT ASSISTANT EA 15: HCPCS

## 2018-03-20 PROCEDURE — 3331090001 HH PPS REVENUE CREDIT

## 2018-03-21 PROCEDURE — 3331090001 HH PPS REVENUE CREDIT

## 2018-03-21 PROCEDURE — 3331090002 HH PPS REVENUE DEBIT

## 2018-03-22 ENCOUNTER — HOME CARE VISIT (OUTPATIENT)
Dept: SCHEDULING | Facility: HOME HEALTH | Age: 53
End: 2018-03-22
Payer: MEDICARE

## 2018-03-22 VITALS
SYSTOLIC BLOOD PRESSURE: 120 MMHG | OXYGEN SATURATION: 98 % | DIASTOLIC BLOOD PRESSURE: 76 MMHG | TEMPERATURE: 98 F | HEART RATE: 82 BPM

## 2018-03-22 PROCEDURE — 3331090001 HH PPS REVENUE CREDIT

## 2018-03-22 PROCEDURE — 3331090002 HH PPS REVENUE DEBIT

## 2018-03-22 PROCEDURE — G0157 HHC PT ASSISTANT EA 15: HCPCS

## 2018-03-23 PROCEDURE — 3331090002 HH PPS REVENUE DEBIT

## 2018-03-23 PROCEDURE — 3331090001 HH PPS REVENUE CREDIT

## 2018-03-24 PROCEDURE — 3331090002 HH PPS REVENUE DEBIT

## 2018-03-24 PROCEDURE — 3331090001 HH PPS REVENUE CREDIT

## 2018-03-25 PROCEDURE — 3331090002 HH PPS REVENUE DEBIT

## 2018-03-25 PROCEDURE — 3331090001 HH PPS REVENUE CREDIT

## 2018-03-26 VITALS
OXYGEN SATURATION: 98 % | SYSTOLIC BLOOD PRESSURE: 120 MMHG | DIASTOLIC BLOOD PRESSURE: 76 MMHG | TEMPERATURE: 98 F | HEART RATE: 88 BPM

## 2018-03-26 PROCEDURE — 3331090002 HH PPS REVENUE DEBIT

## 2018-03-26 PROCEDURE — 3331090001 HH PPS REVENUE CREDIT

## 2018-03-27 ENCOUNTER — HOME CARE VISIT (OUTPATIENT)
Dept: SCHEDULING | Facility: HOME HEALTH | Age: 53
End: 2018-03-27
Payer: MEDICARE

## 2018-03-27 VITALS
TEMPERATURE: 98.3 F | OXYGEN SATURATION: 91 % | SYSTOLIC BLOOD PRESSURE: 115 MMHG | HEART RATE: 89 BPM | DIASTOLIC BLOOD PRESSURE: 80 MMHG

## 2018-03-27 PROCEDURE — 3331090001 HH PPS REVENUE CREDIT

## 2018-03-27 PROCEDURE — 3331090002 HH PPS REVENUE DEBIT

## 2018-03-27 PROCEDURE — G0151 HHCP-SERV OF PT,EA 15 MIN: HCPCS

## 2018-03-28 PROCEDURE — 3331090002 HH PPS REVENUE DEBIT

## 2018-03-28 PROCEDURE — 3331090001 HH PPS REVENUE CREDIT

## 2020-01-01 ENCOUNTER — HOSPITAL ENCOUNTER (OUTPATIENT)
Dept: LAB | Age: 55
Discharge: HOME | End: 2020-12-11

## 2020-01-01 LAB — XX-LABCORP SPECIMEN COL,LCBCF: NORMAL

## 2020-01-01 PROCEDURE — 99001 SPECIMEN HANDLING PT-LAB: CPT

## 2021-01-01 ENCOUNTER — APPOINTMENT (OUTPATIENT)
Dept: GENERAL RADIOLOGY | Age: 56
DRG: 871 | End: 2021-01-01
Attending: EMERGENCY MEDICINE
Payer: MEDICARE

## 2021-01-01 ENCOUNTER — APPOINTMENT (OUTPATIENT)
Dept: CT IMAGING | Age: 56
DRG: 871 | End: 2021-01-01
Attending: EMERGENCY MEDICINE
Payer: MEDICARE

## 2021-01-01 ENCOUNTER — HOSPITAL ENCOUNTER (INPATIENT)
Age: 56
LOS: 1 days | DRG: 871 | End: 2021-03-16
Attending: EMERGENCY MEDICINE | Admitting: HOSPITALIST
Payer: MEDICARE

## 2021-01-01 ENCOUNTER — APPOINTMENT (OUTPATIENT)
Dept: GENERAL RADIOLOGY | Age: 56
DRG: 871 | End: 2021-01-01
Attending: PHYSICIAN ASSISTANT
Payer: MEDICARE

## 2021-01-01 VITALS
DIASTOLIC BLOOD PRESSURE: 67 MMHG | OXYGEN SATURATION: 62 % | WEIGHT: 279.98 LBS | HEIGHT: 64 IN | SYSTOLIC BLOOD PRESSURE: 81 MMHG | TEMPERATURE: 91.8 F | BODY MASS INDEX: 47.8 KG/M2 | HEART RATE: 91 BPM

## 2021-01-01 DIAGNOSIS — J96.01 ACUTE RESPIRATORY FAILURE WITH HYPOXIA AND HYPERCAPNIA (HCC): ICD-10-CM

## 2021-01-01 DIAGNOSIS — A41.9 SEPSIS WITHOUT ACUTE ORGAN DYSFUNCTION, DUE TO UNSPECIFIED ORGANISM (HCC): ICD-10-CM

## 2021-01-01 DIAGNOSIS — J96.02 ACUTE RESPIRATORY FAILURE WITH HYPOXIA AND HYPERCAPNIA (HCC): ICD-10-CM

## 2021-01-01 DIAGNOSIS — I50.9 CONGESTIVE HEART FAILURE, UNSPECIFIED HF CHRONICITY, UNSPECIFIED HEART FAILURE TYPE (HCC): ICD-10-CM

## 2021-01-01 DIAGNOSIS — N39.0 URINARY TRACT INFECTION WITHOUT HEMATURIA, SITE UNSPECIFIED: ICD-10-CM

## 2021-01-01 DIAGNOSIS — A41.9 SEPSIS, DUE TO UNSPECIFIED ORGANISM, UNSPECIFIED WHETHER ACUTE ORGAN DYSFUNCTION PRESENT (HCC): ICD-10-CM

## 2021-01-01 DIAGNOSIS — J69.0 ASPIRATION PNEUMONIA DUE TO GASTRIC SECRETIONS, UNSPECIFIED LATERALITY, UNSPECIFIED PART OF LUNG (HCC): ICD-10-CM

## 2021-01-01 DIAGNOSIS — R60.9 PERIPHERAL EDEMA: ICD-10-CM

## 2021-01-01 DIAGNOSIS — R29.898 WEAKNESS OF BOTH LEGS: ICD-10-CM

## 2021-01-01 DIAGNOSIS — G93.40 ENCEPHALOPATHY ACUTE: ICD-10-CM

## 2021-01-01 DIAGNOSIS — R06.02 SHORTNESS OF BREATH: ICD-10-CM

## 2021-01-01 LAB
ALBUMIN SERPL-MCNC: 3.1 G/DL (ref 3.4–5)
ALBUMIN/GLOB SERPL: 0.8 {RATIO} (ref 0.8–1.7)
ALP SERPL-CCNC: 107 U/L (ref 45–117)
ALT SERPL-CCNC: 59 U/L (ref 13–56)
AMORPH CRY URNS QL MICRO: ABNORMAL
ANION GAP SERPL CALC-SCNC: 6 MMOL/L (ref 3–18)
APPEARANCE UR: ABNORMAL
ARTERIAL PATENCY WRIST A: ABNORMAL
ARTERIAL PATENCY WRIST A: YES
AST SERPL-CCNC: 84 U/L (ref 10–38)
ATRIAL RATE: 132 BPM
B PERT DNA SPEC QL NAA+PROBE: NOT DETECTED
BACTERIA URNS QL MICRO: ABNORMAL /HPF
BASE EXCESS BLD CALC-SCNC: 0 MMOL/L
BASE EXCESS BLDV CALC-SCNC: 5 MMOL/L
BASOPHILS # BLD: 0.1 K/UL (ref 0–0.1)
BASOPHILS NFR BLD: 1 % (ref 0–2)
BDY SITE: ABNORMAL
BDY SITE: ABNORMAL
BILIRUB SERPL-MCNC: 0.6 MG/DL (ref 0.2–1)
BILIRUB UR QL: ABNORMAL
BNP SERPL-MCNC: 4173 PG/ML (ref 0–900)
BODY TEMPERATURE: 101.9
BORDETELLA PARAPERTUSSIS PCR, BORPAR: NOT DETECTED
BUN SERPL-MCNC: 21 MG/DL (ref 7–18)
BUN/CREAT SERPL: 23 (ref 12–20)
C PNEUM DNA SPEC QL NAA+PROBE: NOT DETECTED
CALCIUM SERPL-MCNC: 8.4 MG/DL (ref 8.5–10.1)
CALCULATED P AXIS, ECG09: 35 DEGREES
CALCULATED R AXIS, ECG10: 166 DEGREES
CALCULATED T AXIS, ECG11: 43 DEGREES
CHLORIDE SERPL-SCNC: 102 MMOL/L (ref 100–111)
CK MB CFR SERPL CALC: 2.6 % (ref 0–4)
CK MB SERPL-MCNC: 1.3 NG/ML (ref 5–25)
CK SERPL-CCNC: 50 U/L (ref 26–192)
CO2 SERPL-SCNC: 34 MMOL/L (ref 21–32)
COLOR UR: ABNORMAL
CREAT SERPL-MCNC: 0.92 MG/DL (ref 0.6–1.3)
DIAGNOSIS, 93000: NORMAL
DIFFERENTIAL METHOD BLD: ABNORMAL
EOSINOPHIL # BLD: 0 K/UL (ref 0–0.4)
EOSINOPHIL NFR BLD: 0 % (ref 0–5)
EPITH CASTS URNS QL MICRO: ABNORMAL /LPF (ref 0–5)
ERYTHROCYTE [DISTWIDTH] IN BLOOD BY AUTOMATED COUNT: 16.8 % (ref 11.6–14.5)
FLUAV AG NPH QL IA: NEGATIVE
FLUAV H1 2009 PAND RNA SPEC QL NAA+PROBE: NOT DETECTED
FLUAV H1 RNA SPEC QL NAA+PROBE: NOT DETECTED
FLUAV H3 RNA SPEC QL NAA+PROBE: NOT DETECTED
FLUAV SUBTYP SPEC NAA+PROBE: NOT DETECTED
FLUBV AG NOSE QL IA: NEGATIVE
FLUBV RNA SPEC QL NAA+PROBE: NOT DETECTED
GAS FLOW.O2 O2 DELIVERY SYS: ABNORMAL L/MIN
GAS FLOW.O2 O2 DELIVERY SYS: ABNORMAL L/MIN
GAS FLOW.O2 SETTING OXYMISER: 60 L/M
GLOBULIN SER CALC-MCNC: 4.1 G/DL (ref 2–4)
GLUCOSE SERPL-MCNC: 127 MG/DL (ref 74–99)
GLUCOSE UR STRIP.AUTO-MCNC: NEGATIVE MG/DL
HADV DNA SPEC QL NAA+PROBE: NOT DETECTED
HCO3 BLD-SCNC: 35.1 MMOL/L (ref 22–26)
HCO3 BLDV-SCNC: 30.6 MMOL/L (ref 23–28)
HCOV 229E RNA SPEC QL NAA+PROBE: NOT DETECTED
HCOV HKU1 RNA SPEC QL NAA+PROBE: NOT DETECTED
HCOV NL63 RNA SPEC QL NAA+PROBE: NOT DETECTED
HCOV OC43 RNA SPEC QL NAA+PROBE: NOT DETECTED
HCT VFR BLD AUTO: 54.5 % (ref 35–45)
HGB BLD-MCNC: 16.3 G/DL (ref 12–16)
HGB UR QL STRIP: ABNORMAL
HMPV RNA SPEC QL NAA+PROBE: NOT DETECTED
HPIV1 RNA SPEC QL NAA+PROBE: NOT DETECTED
HPIV2 RNA SPEC QL NAA+PROBE: NOT DETECTED
HPIV3 RNA SPEC QL NAA+PROBE: NOT DETECTED
HPIV4 RNA SPEC QL NAA+PROBE: NOT DETECTED
HYALINE CASTS URNS QL MICRO: ABNORMAL /LPF (ref 0–2)
KETONES UR QL STRIP.AUTO: ABNORMAL MG/DL
LACTATE BLD-SCNC: 1.62 MMOL/L (ref 0.4–2)
LACTATE BLD-SCNC: 2.34 MMOL/L (ref 0.4–2)
LEUKOCYTE ESTERASE UR QL STRIP.AUTO: ABNORMAL
LYMPHOCYTES # BLD: 1.6 K/UL (ref 0.9–3.6)
LYMPHOCYTES NFR BLD: 19 % (ref 21–52)
M PNEUMO DNA SPEC QL NAA+PROBE: NOT DETECTED
MCH RBC QN AUTO: 28.3 PG (ref 24–34)
MCHC RBC AUTO-ENTMCNC: 29.9 G/DL (ref 31–37)
MCV RBC AUTO: 94.6 FL (ref 74–97)
MONOCYTES # BLD: 0.6 K/UL (ref 0.05–1.2)
MONOCYTES NFR BLD: 8 % (ref 3–10)
NEUTS SEG # BLD: 6.3 K/UL (ref 1.8–8)
NEUTS SEG NFR BLD: 72 % (ref 40–73)
NITRITE UR QL STRIP.AUTO: POSITIVE
O2/TOTAL GAS SETTING VFR VENT: 100 %
O2/TOTAL GAS SETTING VFR VENT: 91 %
P-R INTERVAL, ECG05: 126 MS
PCO2 BLD: 113.5 MMHG (ref 35–45)
PCO2 BLDV: 53.4 MMHG (ref 41–51)
PH BLD: 7.1 [PH] (ref 7.35–7.45)
PH BLDV: 7.38 [PH] (ref 7.32–7.42)
PH UR STRIP: 5 [PH] (ref 5–8)
PLATELET # BLD AUTO: 193 K/UL (ref 135–420)
PMV BLD AUTO: 10.3 FL (ref 9.2–11.8)
PO2 BLD: 203 MMHG (ref 80–100)
PO2 BLDV: 66 MMHG (ref 25–40)
POTASSIUM SERPL-SCNC: 4.3 MMOL/L (ref 3.5–5.5)
PROT SERPL-MCNC: 7.2 G/DL (ref 6.4–8.2)
PROT UR STRIP-MCNC: 300 MG/DL
Q-T INTERVAL, ECG07: 306 MS
QRS DURATION, ECG06: 80 MS
QTC CALCULATION (BEZET), ECG08: 453 MS
RBC # BLD AUTO: 5.76 M/UL (ref 4.2–5.3)
RBC #/AREA URNS HPF: ABNORMAL /HPF (ref 0–5)
RSV RNA SPEC QL NAA+PROBE: NOT DETECTED
RV+EV RNA SPEC QL NAA+PROBE: NOT DETECTED
SAO2 % BLD: 99 % (ref 92–97)
SAO2 % BLDV: 89 % (ref 65–88)
SARS-COV-2 PCR, COVPCR: NOT DETECTED
SARS-COV-2, COV2: NORMAL
SERVICE CMNT-IMP: ABNORMAL
SERVICE CMNT-IMP: ABNORMAL
SODIUM SERPL-SCNC: 142 MMOL/L (ref 136–145)
SP GR UR REFRACTOMETRY: >1.03 (ref 1–1.03)
SPECIMEN TYPE: ABNORMAL
SPECIMEN TYPE: ABNORMAL
TOTAL RESP. RATE, ITRR: 15
TOTAL RESP. RATE, ITRR: 18
TROPONIN I SERPL-MCNC: 0.8 NG/ML (ref 0–0.04)
UROBILINOGEN UR QL STRIP.AUTO: 1 EU/DL (ref 0.2–1)
VENTRICULAR RATE, ECG03: 132 BPM
WBC # BLD AUTO: 8.6 K/UL (ref 4.6–13.2)
WBC URNS QL MICRO: ABNORMAL /HPF (ref 0–4)

## 2021-01-01 PROCEDURE — 71250 CT THORAX DX C-: CPT

## 2021-01-01 PROCEDURE — 74011250636 HC RX REV CODE- 250/636: Performed by: EMERGENCY MEDICINE

## 2021-01-01 PROCEDURE — 96374 THER/PROPH/DIAG INJ IV PUSH: CPT

## 2021-01-01 PROCEDURE — 92610 EVALUATE SWALLOWING FUNCTION: CPT

## 2021-01-01 PROCEDURE — 65270000029 HC RM PRIVATE

## 2021-01-01 PROCEDURE — 51702 INSERT TEMP BLADDER CATH: CPT

## 2021-01-01 PROCEDURE — 74011250637 HC RX REV CODE- 250/637: Performed by: EMERGENCY MEDICINE

## 2021-01-01 PROCEDURE — 93005 ELECTROCARDIOGRAM TRACING: CPT

## 2021-01-01 PROCEDURE — 99285 EMERGENCY DEPT VISIT HI MDM: CPT

## 2021-01-01 PROCEDURE — 70450 CT HEAD/BRAIN W/O DYE: CPT

## 2021-01-01 PROCEDURE — 80053 COMPREHEN METABOLIC PANEL: CPT

## 2021-01-01 PROCEDURE — 82553 CREATINE MB FRACTION: CPT

## 2021-01-01 PROCEDURE — 99291 CRITICAL CARE FIRST HOUR: CPT | Performed by: NURSE PRACTITIONER

## 2021-01-01 PROCEDURE — 83605 ASSAY OF LACTIC ACID: CPT

## 2021-01-01 PROCEDURE — 71045 X-RAY EXAM CHEST 1 VIEW: CPT

## 2021-01-01 PROCEDURE — 74011250637 HC RX REV CODE- 250/637: Performed by: HOSPITALIST

## 2021-01-01 PROCEDURE — 0202U NFCT DS 22 TRGT SARS-COV-2: CPT

## 2021-01-01 PROCEDURE — 99223 1ST HOSP IP/OBS HIGH 75: CPT | Performed by: HOSPITALIST

## 2021-01-01 PROCEDURE — 36600 WITHDRAWAL OF ARTERIAL BLOOD: CPT

## 2021-01-01 PROCEDURE — 74011000250 HC RX REV CODE- 250: Performed by: PHYSICIAN ASSISTANT

## 2021-01-01 PROCEDURE — 74011250636 HC RX REV CODE- 250/636: Performed by: PHYSICIAN ASSISTANT

## 2021-01-01 PROCEDURE — 85025 COMPLETE CBC W/AUTO DIFF WBC: CPT

## 2021-01-01 PROCEDURE — 83880 ASSAY OF NATRIURETIC PEPTIDE: CPT

## 2021-01-01 PROCEDURE — 81001 URINALYSIS AUTO W/SCOPE: CPT

## 2021-01-01 PROCEDURE — 76450000000

## 2021-01-01 PROCEDURE — 82803 BLOOD GASES ANY COMBINATION: CPT

## 2021-01-01 PROCEDURE — 87804 INFLUENZA ASSAY W/OPTIC: CPT

## 2021-01-01 PROCEDURE — 87040 BLOOD CULTURE FOR BACTERIA: CPT

## 2021-01-01 PROCEDURE — 70250 X-RAY EXAM OF SKULL: CPT

## 2021-01-01 RX ORDER — ACETAMINOPHEN 650 MG/1
650 SUPPOSITORY RECTAL
Status: COMPLETED | OUTPATIENT
Start: 2021-01-01 | End: 2021-01-01

## 2021-01-01 RX ORDER — LORAZEPAM 2 MG/ML
2 INJECTION INTRAMUSCULAR ONCE
Status: COMPLETED | OUTPATIENT
Start: 2021-01-01 | End: 2021-01-01

## 2021-01-01 RX ORDER — SCOLOPAMINE TRANSDERMAL SYSTEM 1 MG/1
1 PATCH, EXTENDED RELEASE TRANSDERMAL
Status: DISCONTINUED | OUTPATIENT
Start: 2021-01-01 | End: 2021-03-17 | Stop reason: HOSPADM

## 2021-01-01 RX ORDER — SODIUM CHLORIDE 0.9 % (FLUSH) 0.9 %
5-40 SYRINGE (ML) INJECTION AS NEEDED
Status: DISCONTINUED | OUTPATIENT
Start: 2021-01-01 | End: 2021-03-17 | Stop reason: HOSPADM

## 2021-01-01 RX ORDER — IPRATROPIUM BROMIDE AND ALBUTEROL SULFATE 2.5; .5 MG/3ML; MG/3ML
3 SOLUTION RESPIRATORY (INHALATION)
Status: DISCONTINUED | OUTPATIENT
Start: 2021-01-01 | End: 2021-03-17 | Stop reason: HOSPADM

## 2021-01-01 RX ORDER — SODIUM CHLORIDE 0.9 % (FLUSH) 0.9 %
5-10 SYRINGE (ML) INJECTION AS NEEDED
Status: DISCONTINUED | OUTPATIENT
Start: 2021-01-01 | End: 2021-03-17 | Stop reason: HOSPADM

## 2021-01-01 RX ORDER — LORAZEPAM 2 MG/ML
1 INJECTION INTRAMUSCULAR
Status: DISCONTINUED | OUTPATIENT
Start: 2021-01-01 | End: 2021-03-17 | Stop reason: HOSPADM

## 2021-01-01 RX ORDER — POLYETHYLENE GLYCOL 3350 17 G/17G
17 POWDER, FOR SOLUTION ORAL DAILY PRN
Status: DISCONTINUED | OUTPATIENT
Start: 2021-01-01 | End: 2021-03-17 | Stop reason: HOSPADM

## 2021-01-01 RX ORDER — ACETAMINOPHEN 325 MG/1
650 TABLET ORAL
Status: DISCONTINUED | OUTPATIENT
Start: 2021-01-01 | End: 2021-03-17 | Stop reason: HOSPADM

## 2021-01-01 RX ORDER — FUROSEMIDE 10 MG/ML
40 INJECTION INTRAMUSCULAR; INTRAVENOUS
Status: COMPLETED | OUTPATIENT
Start: 2021-01-01 | End: 2021-01-01

## 2021-01-01 RX ORDER — PROMETHAZINE HYDROCHLORIDE 25 MG/1
12.5 TABLET ORAL
Status: DISCONTINUED | OUTPATIENT
Start: 2021-01-01 | End: 2021-03-17 | Stop reason: HOSPADM

## 2021-01-01 RX ORDER — ACETAMINOPHEN 650 MG/1
650 SUPPOSITORY RECTAL
Status: DISCONTINUED | OUTPATIENT
Start: 2021-01-01 | End: 2021-03-17 | Stop reason: HOSPADM

## 2021-01-01 RX ORDER — ONDANSETRON 2 MG/ML
4 INJECTION INTRAMUSCULAR; INTRAVENOUS
Status: DISCONTINUED | OUTPATIENT
Start: 2021-01-01 | End: 2021-03-17 | Stop reason: HOSPADM

## 2021-01-01 RX ORDER — SODIUM CHLORIDE 0.9 % (FLUSH) 0.9 %
5-40 SYRINGE (ML) INJECTION EVERY 8 HOURS
Status: DISCONTINUED | OUTPATIENT
Start: 2021-01-01 | End: 2021-03-17 | Stop reason: HOSPADM

## 2021-01-01 RX ORDER — ENOXAPARIN SODIUM 100 MG/ML
40 INJECTION SUBCUTANEOUS DAILY
Status: DISCONTINUED | OUTPATIENT
Start: 2021-01-01 | End: 2021-01-01

## 2021-01-01 RX ORDER — ONDANSETRON 2 MG/ML
4 INJECTION INTRAMUSCULAR; INTRAVENOUS
Status: COMPLETED | OUTPATIENT
Start: 2021-01-01 | End: 2021-01-01

## 2021-01-01 RX ORDER — MORPHINE SULFATE 2 MG/ML
1 INJECTION, SOLUTION INTRAMUSCULAR; INTRAVENOUS
Status: DISCONTINUED | OUTPATIENT
Start: 2021-01-01 | End: 2021-03-17 | Stop reason: HOSPADM

## 2021-01-01 RX ADMIN — LORAZEPAM 2 MG: 2 INJECTION INTRAMUSCULAR; INTRAVENOUS at 13:35

## 2021-01-01 RX ADMIN — FUROSEMIDE 40 MG: 10 INJECTION, SOLUTION INTRAMUSCULAR; INTRAVENOUS at 18:30

## 2021-01-01 RX ADMIN — SODIUM CHLORIDE 500 ML: 900 INJECTION, SOLUTION INTRAVENOUS at 17:05

## 2021-01-01 RX ADMIN — CEFEPIME HYDROCHLORIDE 2 G: 2 INJECTION, POWDER, FOR SOLUTION INTRAVENOUS at 17:32

## 2021-01-01 RX ADMIN — ONDANSETRON 4 MG: 2 INJECTION INTRAMUSCULAR; INTRAVENOUS at 21:42

## 2021-01-01 RX ADMIN — ACETAMINOPHEN 650 MG: 650 SUPPOSITORY RECTAL at 16:35

## 2021-01-01 RX ADMIN — AZITHROMYCIN DIHYDRATE 500 MG: 500 INJECTION, POWDER, LYOPHILIZED, FOR SOLUTION INTRAVENOUS at 17:39

## 2021-01-01 RX ADMIN — VANCOMYCIN HYDROCHLORIDE 1000 MG: 1 INJECTION, POWDER, LYOPHILIZED, FOR SOLUTION INTRAVENOUS at 21:04

## 2021-03-15 PROBLEM — I50.9 CHF (CONGESTIVE HEART FAILURE) (HCC): Status: ACTIVE | Noted: 2021-01-01

## 2021-03-15 NOTE — ED NOTES
Unable to do Arterial Blood gas at this time, no pulse palpable for the test, MD Kayy Arceo made aware. Will continue to monitor

## 2021-03-15 NOTE — ED NOTES
After arrival, pt immediately had increased trouble breathing. Placed on non-rebreather and then Bi-pap. Dr. Renner Earing at bedside

## 2021-03-15 NOTE — ED PROVIDER NOTES
EMERGENCY DEPARTMENT HISTORY AND PHYSICAL EXAM 
 
5:34 PM 
Date: 3/15/2021 Patient Name: Jethro Alas History of Presenting Illness History Provided By: Patient's family HPI: Jethro Alas is a 50 y.o. female with past medical history of hydrocephalus and occipital encephalocele with  shunts presents with difficulty breathing for 3 to 4 days and cough. According to family she has had reduced appetite. Patient denies any fever or chills. According to family patient is able to answer some questions and has been at baseline mental status at home. Patient is bedbound at baseline. PCP: Cory Lopez NP Past History Past Medical History: 
Past Medical History:  
Diagnosis Date  Cerebral palsy ([address]  Hematoma   
 left tibia  Osteoarthritis of right knee  Osteoporosis   
 diffuse  Pressure ulcer of heel   
 right  Scoliosis   
 thoracolumar  Sprain   
 right lower leg and ankle Past Surgical History: 
Past Surgical History:  
Procedure Laterality Date  HX ORTHOPAEDIC    
 leg surgery x 2  
 HX OTHER SURGICAL    
 shunts inserted in left and right side of head Family History: 
Family History Problem Relation Age of Onset  Diabetes Other NOS Social History: 
Social History Tobacco Use  Smoking status: Never Smoker  Smokeless tobacco: Never Used Substance Use Topics  Alcohol use: No  
 Drug use: No  
 
 
Allergies: Allergies Allergen Reactions  Penicillins Not Reported This Time Review of Systems Review of Systems Unable to perform ROS: Acuity of condition Physical Exam  
 
Patient Vitals for the past 12 hrs: 
 Temp Pulse Resp BP SpO2  
03/15/21 1708  (!) 130 30 123/70 99 % 03/15/21 1700  (!) 133 25 123/70 99 % 03/15/21 1648 (!) 103.8 °F (39.9 °C) (!) 130 26 129/65 100 % 03/15/21 1645  (!) 135 22 (!) 140/110 99 % 03/15/21 1630  (!) 142  129/65 97 % 03/15/21 1615   8 (!) 144/98 (!) 70 % Physical Exam 
Vitals signs and nursing note reviewed. Constitutional:   
   General: She is in acute distress. Appearance: She is well-developed. She is obese. HENT:  
   Head: Normocephalic and atraumatic. Eyes:  
   General:     
   Right eye: No discharge. Left eye: No discharge. Neck: Musculoskeletal: Normal range of motion and neck supple. Cardiovascular:  
   Rate and Rhythm: Normal rate and regular rhythm. Heart sounds: Normal heart sounds. No murmur. Pulmonary:  
   Effort: Tachypnea and respiratory distress present. Breath sounds: No stridor. Examination of the right-upper field reveals decreased breath sounds. Examination of the left-upper field reveals decreased breath sounds. Examination of the right-middle field reveals decreased breath sounds. Examination of the left-middle field reveals decreased breath sounds. Examination of the right-lower field reveals decreased breath sounds. Examination of the left-lower field reveals decreased breath sounds. Decreased breath sounds present. No wheezing, rhonchi or rales. Chest:  
   Chest wall: No tenderness. Abdominal:  
   General: Bowel sounds are normal. There is no distension. Palpations: Abdomen is soft. Tenderness: There is no abdominal tenderness. There is no guarding or rebound. Musculoskeletal: Normal range of motion. Right lower leg: Edema present. Left lower leg: Edema present. Skin: 
   General: Skin is warm and dry. Neurological:  
   Mental Status: She is alert and oriented to person, place, and time. Diagnostic Study Results Labs - Recent Results (from the past 12 hour(s)) EKG, 12 LEAD, INITIAL Collection Time: 03/15/21  4:16 PM  
Result Value Ref Range Ventricular Rate 129 BPM  
 Atrial Rate 129 BPM  
 P-R Interval 120 ms QRS Duration 82 ms Q-T Interval 304 ms QTC Calculation (Bezet) 445 ms Calculated P Axis 33 degrees Calculated R Axis 167 degrees Calculated T Axis 38 degrees Diagnosis Sinus tachycardia with fusion complexes Right axis deviation Right ventricular hypertrophy with repolarization abnormality Nonspecific T wave abnormality Abnormal ECG When compared with ECG of 14-FEB-2018 13:36, 
fusion complexes are now present POC LACTIC ACID Collection Time: 03/15/21  4:30 PM  
Result Value Ref Range Lactic Acid (POC) 2.34 (HH) 0.40 - 2.00 mmol/L  
CBC WITH AUTOMATED DIFF Collection Time: 03/15/21  4:41 PM  
Result Value Ref Range WBC 8.6 4.6 - 13.2 K/uL  
 RBC 5.76 (H) 4.20 - 5.30 M/uL  
 HGB 16.3 (H) 12.0 - 16.0 g/dL HCT 54.5 (H) 35.0 - 45.0 % MCV 94.6 74.0 - 97.0 FL  
 MCH 28.3 24.0 - 34.0 PG  
 MCHC 29.9 (L) 31.0 - 37.0 g/dL  
 RDW 16.8 (H) 11.6 - 14.5 % PLATELET 556 650 - 463 K/uL MPV 10.3 9.2 - 11.8 FL  
 NEUTROPHILS 72 40 - 73 % LYMPHOCYTES 19 (L) 21 - 52 % MONOCYTES 8 3 - 10 % EOSINOPHILS 0 0 - 5 % BASOPHILS 1 0 - 2 %  
 ABS. NEUTROPHILS 6.3 1.8 - 8.0 K/UL  
 ABS. LYMPHOCYTES 1.6 0.9 - 3.6 K/UL  
 ABS. MONOCYTES 0.6 0.05 - 1.2 K/UL  
 ABS. EOSINOPHILS 0.0 0.0 - 0.4 K/UL  
 ABS. BASOPHILS 0.1 0.0 - 0.1 K/UL  
 DF AUTOMATED METABOLIC PANEL, COMPREHENSIVE Collection Time: 03/15/21  5:00 PM  
Result Value Ref Range Sodium 142 136 - 145 mmol/L Potassium 4.3 3.5 - 5.5 mmol/L Chloride 102 100 - 111 mmol/L  
 CO2 34 (H) 21 - 32 mmol/L Anion gap 6 3.0 - 18 mmol/L Glucose 127 (H) 74 - 99 mg/dL BUN 21 (H) 7.0 - 18 MG/DL Creatinine 0.92 0.6 - 1.3 MG/DL  
 BUN/Creatinine ratio 23 (H) 12 - 20 GFR est AA >60 >60 ml/min/1.73m2 GFR est non-AA >60 >60 ml/min/1.73m2 Calcium 8.4 (L) 8.5 - 10.1 MG/DL Bilirubin, total 0.6 0.2 - 1.0 MG/DL  
 ALT (SGPT) 59 (H) 13 - 56 U/L  
 AST (SGOT) 84 (H) 10 - 38 U/L Alk. phosphatase 107 45 - 117 U/L Protein, total 7.2 6.4 - 8.2 g/dL Albumin 3.1 (L) 3.4 - 5.0 g/dL Globulin 4.1 (H) 2.0 - 4.0 g/dL A-G Ratio 0.8 0.8 - 1.7    
 
 
Radiologic Studies -  
No results found. 
 
 
Medical Decision Making  
 
ED Course: Progress Notes, Reevaluation, and Consults: 
 
5:34 PM Initial assessment performed. The patients presenting problems have been discussed, and they/their family are in agreement with the care plan formulated and outlined with them.  I have encouraged them to ask questions as they arise throughout their visit. 
 
 
Provider Notes (Medical Decision Making):  
Patient presents with respiratory distress, tachycardia and fever 
Patient not hypotensive 
Patient drowsy but arousable 
Patient initially hypoxic to 70-80s 
Patient placed initially on nonrebreather and later on BiPAP 
Patient not hypoxic on BiPAP, 80% 
Patient very fluid overloaded, edematous  unable to obtain ABG, ordered VBG 
Given that patient appears lethargic, I considered intubation 
Discussed with patient's family (brother and sister patient's proxies) they want patient to be DNR/DNI 
plan to obtain labs, imaging 
Old medical records reviewed: 
EKG as interpreted by me: Sinus tachycardia, 132, no ST changes 
Labs as interpreted by me: No electrolyte abnormality 
Lactate:2.5 
Sepsis orders placed including antibiotics 
Not going to give patient fluids because she is fluid overloaded, discussed with family who agrees 
UA Positive for UTI 
Given patient's respiratory status also concern for pneumonia.  Patient bedbound, possible aspiration.  Patient will be tested for Covid. 
 
7:31 PM Patient lethargic but arousable on reassessment 
Tachycardia has now resolved 
Will order  shunt series, CT head CT chest 
X-ray showed interstitial edema, pleural effusions, possible infiltrate 
Given pedal edema and concern for fluid overload.  Patient will be given Lasix 
Covid test pending 
 
Troponin of 0.85 elevated proBNP, 4173 
Elevated troponin most likely due to demand ischemia 
Discussed with cardiology FELICITA Perez, troponin to be trended-as per  cardiology we will not initiate heparin at present as very likely mildly elevated troponin due to demand ischemia Discussed with hospitalist Dr. Forbes Phalen about source of infection  since patient drowsy we cannot rule out shunt is a source of infection Patient to be transferred to another facility for neurosurgery evaluation [facility]. Dr. Sarah Fernandez to follow-up for transfer 7:35 PM : Pt care transferred to Dr. Sarah Fernandez  ,ED provider. History of patient complaint(s), available diagnostic reports and current treatment plan has been discussed thoroughly. Intended disposition of patient : admit Pending diagnostics reports and/or labs (please list): CT head, CT chest, Covid,  shunt series Critical Care: CRITICAL CARE: 
 
I have spent 40 minutes of critical care time involved in lab review, consultations with specialist, family decision-making, and documentation. This time does not include separately billable procedures. During this entire length of time I was immediately available to the patient. Critical Care: The reason for providing this level of medical care for this critically ill patient was due a critical illness that impaired one or more vital organ systems such that there was a high probability of imminent or life threatening deterioration in the patients condition. This care involved high complexity decision making to assess, manipulate, and support vital system functions, to treat this degreee vital organ system failure and to prevent further life threatening deterioration of the patients condition. Vital Signs-Reviewed the patient's vital signs. Reviewed pt's pulse ox reading. Records Reviewed: old medical records 
-I am the first provider for this patient. 
-I reviewed the vital signs, available nursing notes, past medical history, past surgical history, family history and social history. Current Facility-Administered Medications Medication Dose Route Frequency Provider Last Rate Last Admin  sodium chloride (NS) flush 5-10 mL  5-10 mL IntraVENous PRN Shweta Pereira MD      
 azithromycin (ZITHROMAX) 500 mg in 0.9% sodium chloride 250 mL (VIAL-MATE)  500 mg IntraVENous NOW Shweta Pereira MD      
 cefepime (MAXIPIME) 2 g in sterile water (preservative free) 10 mL IV syringe  2 g IntraVENous Q8H Shelli Keenper, [address]   2 g at 03/15/21 1732  sodium chloride 0.9 % bolus infusion 500 mL  500 mL IntraVENous Pito Beltre  mL/hr at 03/15/21 1705 500 mL at 03/15/21 1705  vancomycin (VANCOCIN) 1,000 mg in 0.9% sodium chloride 250 mL (VIAL-MATE)  1,000 mg IntraVENous NOW Shweta Pereira MD      
 furosemide (LASIX) injection 40 mg  40 mg IntraVENous NOW Shweta Pereira MD      
 
Current Outpatient Medications Medication Sig Dispense Refill  potassium chloride (K-DUR, KLOR-CON) 20 mEq tablet Take 20 mEq by mouth daily.  furosemide (LASIX) 40 mg tablet Take 40 mg by mouth daily.  multivitamin (ONE A DAY) tablet Take 1 Tab by mouth daily.  calcium-vitamin D (OYSTER SHELL) 500 mg(1,250mg) -200 unit per tablet Take 1 Tab by mouth two (2) times daily (with meals). Clinical Impression Clinical Impression: No diagnosis found. Disposition: This note was dictated utilizing voice recognition software which may lead to typographical errors. I apologize in advance if the situation occurs. If questions arise please do not hesitate to contact me or call our department.  
 
Tanvir Munroe MD 
5:34 PM

## 2021-03-15 NOTE — ED NOTES
Per [facility] is on [facility]; per MD request will attempt to find admitting provider at [address] if that is unavailable.

## 2021-03-15 NOTE — ED TRIAGE NOTES
Pt brought in by EMS for c/o SOB. Arrived in ED and was responsive to her name, and knew where she was. Initial color was gray.

## 2021-03-15 NOTE — Clinical Note
Status:: INPATIENT [101]   Type of Bed: Stepdown [17]   Inpatient Hospitalization Certified Necessary for the Following Reasons: 3. Patient receiving treatment that can only be provided in an inpatient setting (further clarification in H&P documentation)   Admitting Diagnosis: CHF (congestive heart failure) ([facility]) [[id number]]   Admitting Physician: CHELSEY KAHN [[id number]]   Attending Physician: CHELSEY KAHN [[id number]]   Estimated Length of Stay: 2 Midnights   Discharge Plan:: Home with Office Follow-up

## 2021-03-16 PROBLEM — A41.9 SEPSIS (HCC): Status: ACTIVE | Noted: 2021-01-01

## 2021-03-16 NOTE — ED NOTES
Provider notified this nurse that the patient is now comfort care. Medications are to be held, oxygen for comfort.

## 2021-03-16 NOTE — ED NOTES
Unable to get local hospital acceptance; [facility] is attempting to contact neurosurgery at [facility]

## 2021-03-16 NOTE — ED NOTES
TRANSFER - OUT REPORT: 
 
Telephone/Verbal report given to Christel(name) on Chance Dougherty  being transferred to [facility] -ED(unit) for routine progression of care    
 
Report consisted of patient’s Situation, Background, Assessment and  
Recommendations(SBAR).  
 
Information from the following report(s) SBAR, Kardex, ED Summary, Procedure Summary, Intake/Output, MAR and Accordion was reviewed with the receiving nurse. 
 
Opportunity for questions and clarification was provided.   
 
Patient transported with: 
 Monitor 
O2 @ 15 liters -Non Rebreather

## 2021-03-16 NOTE — ED NOTES
rec'd pt in sign out, unable to obtain abg on mult attempts. vbg shows mild hypercarbia only. Pt needs airway protection, obtunded, but is dnr dni, so plan per dr Niurka Tobin was to continue on bipap.  
8:34 PM d/w dr Brenton Mann, hospitatlist at , declines admit due to concern for infection w bp shunt present. 8:45 PM d/w Ming Case, neurosurgeon on call at [address] facility w neurosurgery back up w bed availability per access center - he states long term shunt, not causing infection, he reviewed images, states ventricles small, no s/o obstruction, states no indication for ns evaluation at this time, no indication for transfer, can be admitted here. 8:52 PM d/w dr Brenton Mann, told of d./w neurosurgery, agrees w admit. To consult pulmonology 9:03 PM d/w pt sister, states pcp is [facility] in [place], [place] in [place] 
9:06 PM d/w dr Suzy Don, recommends high flow o2 in pt's obtunded condition. To consult. Cannot do high flow o2 here at [facility]. No beds avail at  now, will transfer urgently to  ed for high flow o2 
9:43 PM pt obtunded, unchanged from my arrival, needs high flow o2, unable to do here at , to transfer to  ed pending bed assignement D/w dr Willian Martinez,  ed, accepts pt for transfer,  
Transfer pending, transfer medic team in ed, but transfer delayed due to pt weight requiring more transport members for lifting assistance. As soon as team arrived. Pt to  for high flow o2.

## 2021-03-16 NOTE — PROGRESS NOTES
Speech Pathology:  
 
Bedside swallow completed. Safest diet would be strict NPO with consideration of an alternate means of nutrition/hydration. Per chart review: awaiting palliative consult for possible comfort feeds. Full report to follow. Thank you for this referral. 
 
Tori Ordonez M.S. CCC-SLP/L Speech-Language Pathologist

## 2021-03-16 NOTE — ED NOTES
Patients' sister Angel Pelaez- [phone]), made aware of patient transfer to [facility]-ED

## 2021-03-16 NOTE — PROGRESS NOTES
ABG- hospitalist and pulmonology notified Results for Monty Blair (MRN [id number]) as of 3/16/2021 02:13 Ref. Range 3/16/2021 01:23  
pH (POC) Latest Ref Range: 7.35 - 7.45   7.10 (LL)  
pCO2 (POC) Latest Ref Range: 35.0 - 45.0 MMHG 113.5 (H)  
pO2 (POC) Latest Ref Range: 80 - 100 MMHG 203 (H) HCO3 (POC) Latest Ref Range: 22 - 26 MMOL/L 35.1 (H)  
sO2 (POC) Latest Ref Range: 92 - 97 % 99 (H) Base excess (POC) Latest Units: mmol/L 0  
FIO2 (POC) Latest Units: % 91 Specimen type (POC) Latest Units:   ARTERIAL Flow rate (POC) Latest Units: L/M 60 Site Latest Units:   RIGHT RADIAL Device: Latest Units:   High Flow Nasal Cannula Total resp. rate Latest Units:   18 Allens test (POC) Latest Units:   YES

## 2021-03-16 NOTE — PROGRESS NOTES
conducted an initial consultation and Spiritual Assessment for Aliyah Méndez, who is a 50 y.o.,female. Patients Primary Language is: [place]. According to the patients EMR Mandaen Affiliation is: [facility].  
 
The reason the Patient came to the hospital is:  
Patient Active Problem List  
 Diagnosis Date Noted  Sepsis ([facility] [address] 03/16/2021  CHF (congestive heart failure) ([facility] 03/15/2021  Shortness of breath 02/14/2018  Weakness of both legs 02/14/2018  Craniostenosis 02/14/2018  Peripheral edema 02/14/2018  Follow-up exam, less than 3 months since previous exam, DEXA scan The  provided the following Interventions: 
Initiated a relationship of care and support. Explored issues of corky, belief, spirituality and Mandaen/ritual needs while hospitalized. Listened empathically. Provided information about Spiritual Care Services. Offered prayer and assurance of continued prayers on patient's behalf. Chart reviewed. The following outcomes where achieved: 
Patient shared limited information about both their medical narrative and spiritual journey/beliefs.  confirmed Patient's Mandaen Affiliation. Family expressed gratitude for 's visit. Plan: 
Chaplains will continue to follow and will provide pastoral care on an as needed/requested basis.  recommends bedside caregivers page  on duty if patient shows signs of acute spiritual or emotional distress. [phone] [facility] Spiritual Care 
([phone])

## 2021-03-16 NOTE — PROGRESS NOTES
Witnessed telephone consent for DNR/DNI status with patient's sister, Danielle Hall per Dr. Eli Das.

## 2021-03-16 NOTE — PROGRESS NOTES
Pt has medications scheduled; however pt is comfort measures only, this was passed on by offgoing nurse and its my understanding the pt family made this decision recently. Scopalimine patch is behind R ear, dated today. Pt still requires frequent suctioning. I have paged both listed providers about recent orders ( meds and labs) and and am awaiting clarification at this time.   
 
Michel Ritter

## 2021-03-16 NOTE — H&P
History & Physical 
 
Patient: Marquis Lunsford MRN: [id number]  CSN: [id number] YOB: 1965  Age: 50 y.o. Sex: female DOA: 3/15/2021 Chief Complaint:  
Chief Complaint Patient presents with  Shortness of Breath HPI:  
 
Marquis Lunsford is a 50 y.o.  female with past medical history of cerebral palsy, mental retardation, history of  shunt, bedbound status comes to [facility] emergency room for evaluation of shortness of breath, decreased p.o. intake and poorly responsive. Patient [facility] emergency room was noted to have bilateral pneumonia, UTI and also metabolic encephalopathy. In ER patient was also placed on BiPAP due to respiratory distress but ABG could not be obtained. VBG showed her PCO2 56 so patient was transitioned to high flow O2 and was transferred to [facility] ED. Patient was seen and evaluated by me in no DVT. Patient obtunded and unresponsive. Could not obtain any history from the patient. Spoke to patient's sisters over the phone, per sister patient has been slowly declining over the last 2 weeks. Patient has been decreased p.o. intake, decreased appetite. Patient is also having episodes of coughing with p.o. intake. Last couple of days patient has been more lethargic at home. She noted to have increased work of breathing so decided to get her to the ED. Patient also gets on and off constipation. Patient did not have any recorded temperature at home, no nausea or vomiting, no diarrhea. In ED, patient was started on high flow O2. Past Medical History:  
Diagnosis Date  Cerebral palsy ([address]  Hematoma   
 left tibia  Osteoarthritis of right knee  Osteoporosis   
 diffuse  Pressure ulcer of heel   
 right  Scoliosis   
 thoracolumar  Sprain   
 right lower leg and ankle Past Surgical History:  
Procedure Laterality Date  HX ORTHOPAEDIC    
 leg surgery x 2  
 HX OTHER SURGICAL    
 shunts inserted in left and right side of head Family History Problem Relation Age of Onset  Diabetes Other NOS Social History Socioeconomic History  Marital status: UNKNOWN Spouse name: Not on file  Number of children: Not on file  Years of education: Not on file  Highest education level: Not on file Tobacco Use  Smoking status: Never Smoker  Smokeless tobacco: Never Used Substance and Sexual Activity  Alcohol use: No  
 Drug use: No  
 
 
Prior to Admission medications Medication Sig Start Date End Date Taking? Authorizing Provider  
potassium chloride (K-DUR, KLOR-CON) 20 mEq tablet Take 20 mEq by mouth daily. Provider, Historical  
furosemide (LASIX) 40 mg tablet Take 40 mg by mouth daily. Provider, Historical  
multivitamin (ONE A DAY) tablet Take 1 Tab by mouth daily. Provider, Historical  
calcium-vitamin D (OYSTER SHELL) 500 mg(1,250mg) -200 unit per tablet Take 1 Tab by mouth two (2) times daily (with meals). Provider, Historical  
 
 
Allergies Allergen Reactions  Penicillins Not Reported This Time Review of Systems As described above otherwise could not obtain any review of system from the patient due to unresponsive state. Physical Exam:  
 
Physical Exam: 
Visit Vitals BP (!) 100/53 Pulse (!) 110 Temp 100 °F (37.8 °C) Resp 25 SpO2 100% O2 Flow Rate (L/min): 60 l/min O2 Device: Heated, Hi flow nasal cannula(airvo) Temp (24hrs), Av.9 °F (38.8 °C), Min:100 °F (37.8 °C), Max:103.8 °F (39.9 °C) No intake/output data recorded.  0701 - 03/15 1900 In: 100 [I.V.:100] Out: 200 [Urine:200] General:   Obtunded and unresponsive, mild distress with upper airway gurgling. Head: Normocephalic, without obvious abnormality, atraumatic. Eyes:  Conjunctivae/corneas clear. PERRL Nose:  Nasal trumpet noted, on high flow O2 Neck: Supple, symmetrical, trachea midline, no adenopathy, thyroid: no enlargement, no carotid bruit and no JVD. Lungs:    Bilateral coarse breath sounds heard, mild tachypnea, upper airway gurgling heard Heart:  Regular rate and rhythm, S1, S2 normal.  
  Abdomen: Soft, non-tender. Bowel sounds normal.   
Extremities:  Trace edema, no cyanosis Pulses: 2+ and symmetric all extremities. Skin:  No rashes or lesions Neurologic:  Obtunded unresponsive. Klein catheter in place. Labs Reviewed: 
 
BMP:  
Lab Results Component Value Date/Time  03/15/2021 05:00 PM  
 K 4.3 03/15/2021 05:00 PM  
  03/15/2021 05:00 PM  
 CO2 34 (H) 03/15/2021 05:00 PM  
 AGAP 6 03/15/2021 05:00 PM  
  (H) 03/15/2021 05:00 PM  
 BUN 21 (H) 03/15/2021 05:00 PM  
 CREA 0.92 03/15/2021 05:00 PM  
 GFRAA >60 03/15/2021 05:00 PM  
 GFRNA >60 03/15/2021 05:00 PM  
 
CMP:  
Lab Results Component Value Date/Time  03/15/2021 05:00 PM  
 K 4.3 03/15/2021 05:00 PM  
  03/15/2021 05:00 PM  
 CO2 34 (H) 03/15/2021 05:00 PM  
 AGAP 6 03/15/2021 05:00 PM  
  (H) 03/15/2021 05:00 PM  
 BUN 21 (H) 03/15/2021 05:00 PM  
 CREA 0.92 03/15/2021 05:00 PM  
 GFRAA >60 03/15/2021 05:00 PM  
 GFRNA >60 03/15/2021 05:00 PM  
 CA 8.4 (L) 03/15/2021 05:00 PM  
 ALB 3.1 (L) 03/15/2021 05:00 PM  
 TP 7.2 03/15/2021 05:00 PM  
 GLOB 4.1 (H) 03/15/2021 05:00 PM  
 AGRAT 0.8 03/15/2021 05:00 PM  
 ALT 59 (H) 03/15/2021 05:00 PM  
 
CBC:  
Lab Results Component Value Date/Time WBC 8.6 03/15/2021 04:41 PM  
 HGB 16.3 (H) 03/15/2021 04:41 PM  
 HCT 54.5 (H) 03/15/2021 04:41 PM  
  03/15/2021 04:41 PM  
 
All Cardiac Markers in the last 24 hours:  
Lab Results Component Value Date/Time CPK 50 03/15/2021 04:41 PM  
 CKMB 1.3 03/15/2021 04:41 PM  
 CKND1 2.6 03/15/2021 04:41 PM  
 TROIQ 0.80 (H) 03/15/2021 04:41 PM  
 
ABG:  
Lab Results Component Value Date/Time  PHI 7.10 (LL) 03/16/2021 01:23 AM  
 PCO2I 113.5 (H) 03/16/2021 01:23 AM  
 PO2I 203 (H) 03/16/2021 01:23 AM  
 HCO3I 35.1 (H) 03/16/2021 01:23 AM  
 FIO2I 91 03/16/2021 01:23 AM  
 
COAGS: No results found for: APTT, PTP, INR, INREXT, INREXT Liver Panel:  
Lab Results Component Value Date/Time ALB 3.1 (L) 03/15/2021 05:00 PM  
 TP 7.2 03/15/2021 05:00 PM  
 GLOB 4.1 (H) 03/15/2021 05:00 PM  
 AGRAT 0.8 03/15/2021 05:00 PM  
 ALT 59 (H) 03/15/2021 05:00 PM  
  03/15/2021 05:00 PM  
 
CT Results (most recent): 
Results from Hospital Encounter encounter on 03/15/21 CT CHEST WO CONT Narrative EXAMINATION: CT chest without contrast 
 
INDICATION: Altered mental status,  shunt COMPARISON: None TECHNIQUE: CT of the chest performed without contrast with multiplanar 
reformations. All CT scans at this facility are performed using dose 
optimization technique as appropriate to a performed exam, to include automated 
exposure control, adjustment of the mA and/or kV according to patient size 
(including appropriate matching first site specific examinations), or use of 
iterative reconstruction technique. FINDINGS: 
 
Evaluation limited by streak artifact from arms at side and body habitus. Evaluation of soft tissues and vessels also limited without vascular 
enhancement. Cardiovascular: Left neck catheter/line traveling along course of left jugular 
vein and left innominate vein, tip along the anterior margin of the SVC. A 
second catheter/neck at the base of neck near midline extending caudally 
eventually entering right atrium with tip at level of right ventricle. Notable 
amount of collateral vessels in the upper mediastinum and right base of neck 
with large azygous vein, and additional extensive collateral vessels in the 
upper abdomen periaortic region. Heart size normal. 
 
Mediastinum: Imaged thyroid unremarkable. No definite adenopathy by size 
criteria. Small hiatal hernia. Esophagus nondistended. Pleura: Small bilateral pleural effusions. No definite pneumothorax. Lungs/airways: Motion limits evaluation. Bronchomalacia noted. Bilateral lower 
lobe extensive consolidation, likely partly due to compressive atelectasis. Patchy/mosaic groundglass opacities throughout the lungs and a few streaky 
densities. Upper abdomen: Slightly nodular appearance of the liver. Miscellaneous: Superficial soft tissues unremarkable. Bones: No obvious acute osseous findings. S-shaped thoracic scoliosis with 
dextrocurvature in the upper T-spine and levocurvature in the lower T-spine. Impression Extensive bilateral lower lobe consolidation, likely partly due to atelectasis, 
but component of infiltrate also suspected. Query aspiration. 
-Small pleural effusions. 
-Patchy pulmonary groundglass and streaky densities as well, may represent edema 
or additional infiltrate. Bronchomalacia also noted. Probable stenosis/occlusion of some of the major veins in the upper mediastinum, 
not well delineated due to exam limitations described above, with extensive 
collaterals in the chest and upper abdomen as summarized above. 
-2 catheters/lines and in the neck may be within collapsed/occluded veins as 
described. Small hiatal hernia. Slightly nodular contour of the liver. Any concern for 
cirrhosis? See additional details above. Procedures/imaging: see electronic medical records for all procedures/Xrays and details which were not copied into this note but were reviewed prior to creation of Plan Assessment/Plan 1. Sepsis with fever and tachycardia: Continue empiric antibiotics with cefepime, vancomycin and Zithromax. Follow-up cultures. 2. Bilateral pneumonia: High concern for aspiration: We will continue empiric antibiotics. Will place patient on aspiration precaution, SLP eval will obtain. 3. Acute metabolic encephalopathy due to #1 and 2 
4. Acute hypoxic respiratory failure due to #2: We will continue high flow O2 supplement and will obtain ABG.   Pulmonary consult discussed by ED with Dr. Jadon Aponte. 5. UTI: Continue empiric antibiotics, follow-up cultures 6. Lactic acidosis: Due to sepsis, improved now 7. Negative COVID-19 
8. Abnormal LFT due to sepsis 9. Concern for mild fluid overload: Elevated BNP, possible diastolic heart failure: Got Lasix in the ED. 10. Failure to thrive in adult 11. Cerebral palsy with mental retardation: Supportive care 12. Bedbound status at home: Supportive care 13. DVT/GI Prophylaxis: Lovenox 14. DNR and DNI Discussed with patient's 2 sisters Kate Nolan [phone] and also Ms. Loan Pizano over the phone and explained about patient's current condition with very guarded prognosis. Explained about my above plan of care. Both of them understood and agreed with the plan. I also discussed with him about advanced directives, they were very clear they want DNR and DNI. DNR form was filled out along with weakness from [facility]. Addendum 1:50 AM 
Respiratory therapist called me with ABG results pH 7.10 and PCO2 113 Patient not a candidate for BiPAP given her obtunded state. Patient is also DNR. Discussed with pulmonary Dr. Jadon Aponte, agree with no BiPAP but okay to use if patient's family understands the risk of aspiration. Discussed with the patient's POA Ms. Kate Nolan over the phone extensively about the ABG results and further plan including aggressive care with BiPAP including risk of aspiration versus starting on comfort measures. POA wanted to talk to her siblings and her dad and get back to me. Called CASTILLO Trinidad again, family made a decision to go with comfort measures. They do not want BiPAP, no aggressive measures, no tube feeds, no labs or further testing. They do not want no escalation of care but okay to continue antibiotics for now. Discussed with Dr. Jadon Aponte, agree with the plan. Discussed with RN and also respiratory therapist. 
Comfort care orders placed.  
 
I spent 70 minutes with the patient in face-to-face consultation, of which greater than 50% was spent in counseling and coordination of care as described above Josy Jerry MD 
3/16/2021 1:19 AM 
 
Disclaimer: Sections of this note are dictated using utilizing voice recognition software. Minor typographical errors may be present. If questions arise, please do not hesitate to contact me or call our department.

## 2021-03-16 NOTE — REMOTE MONITORING
Message left with ED bernie Fontana regarding sepsis bundle. Uri Ferris RN [id number] [facility] [phone]

## 2021-03-16 NOTE — PROGRESS NOTES
Spoke with patients sister, Enma Abbott, about the family's plan for discharge. Explained that patient would need 24hr caregivers that are NOT provided by Medicare or hospice. Medicaid already provides daily care givers for varying hours but stressed that they would not cover 24hr care. Discussed option of LTC facility for placement. She will discuss with other family members and get back to me. Uma Sanderson RN - Outcomes Manager  [phone]

## 2021-03-16 NOTE — DISCHARGE SUMMARY
Internal Medicine Discharge Summary Patient: Jethro Alas YOB: 1965 Age:  50 y.o. Admit Date: 3/15/2021 Discharge Date: 3/16/2021 LOS:  LOS: 1 day Discharge To:  Consults: None Admission Diagnoses: CHF (congestive heart failure) ([facility] [I50.9] Sepsis ([facility] [A41.9] Discharge Diagnoses:   
Problem List as of 3/16/2021 Date Reviewed: 2018 Codes Class Noted - Resolved * (Principal) Sepsis ([facility] ICD-10-CM: A41.9 ICD-9-CM: 038.9, 995.91  3/16/2021 - Present CHF (congestive heart failure) (HCC) ICD-10-CM: I50.9 ICD-9-CM: 428.0  3/15/2021 - Present Shortness of breath ICD-10-CM: R06.02 
ICD-9-CM: 786.05  2018 - Present Weakness of both legs ICD-10-CM: R29.898 ICD-9-CM: 729.89  2018 - Present Craniostenosis ICD-10-CM: Q75.0 ICD-9-CM: 756.0  2018 - Present Peripheral edema ICD-10-CM: R60.9 ICD-9-CM: 782.3  2018 - Present Follow-up exam, less than 3 months since previous exam, DEXA scan ICD-10-CM: Z09 ICD-9-CM: V67.9  Unknown - Present Discharge Condition:   Procedures: None HPI: Jethro Alas is a 50 y.o.  female with past medical history of cerebral palsy, mental retardation, history of  shunt, bedbound status comes to [facility] emergency room for evaluation of shortness of breath, decreased p.o. intake and poorly responsive. Patient [facility] emergency room was noted to have bilateral pneumonia, UTI and also metabolic encephalopathy. In ER patient was also placed on BiPAP due to respiratory distress but ABG could not be obtained. VBG showed her PCO2 56 so patient was transitioned to high flow O2 and was transferred to [facility]. Patient was seen and evaluated by me in no DVT. Patient obtunded and unresponsive. Could not obtain any history from the patient.   Spoke to patient's sisters over the phone, per sister patient has been slowly declining over the last 2 weeks. Patient has been decreased p.o. intake, decreased appetite. Patient is also having episodes of coughing with p.o. intake. Last couple of days patient has been more lethargic at home. She noted to have increased work of breathing so decided to get her to the ED. Patient also gets on and off constipation. Patient did not have any recorded temperature at home, no nausea or vomiting, no diarrhea. In ED, patient was started on high flow O2. Hospital Course: 1. Sepsis with fever and tachycardia: Continue empiric antibiotics with cefepime, vancomycin and Zithromax. Follow-up cultures. 2. Bilateral pneumonia: High concern for aspiration: We will continue empiric antibiotics. Will place patient on aspiration precaution, SLP eval will obtain. 3. Acute metabolic encephalopathy due to #1 and 2 
4. Acute hypoxic respiratory failure due to #2: We will continue high flow O2 supplement and will obtain ABG. Pulmonary consult discussed by ED with Dr. Chad Marks. 5. UTI: Continue empiric antibiotics, follow-up cultures 6. Lactic acidosis: Due to sepsis, improved now 7. Negative COVID-19 
8. Abnormal LFT due to sepsis 9. Concern for mild fluid overload: Elevated BNP, possible diastolic heart failure: Got Lasix in the ED. 10. Failure to thrive in adult 11. Cerebral palsy with mental retardation: Supportive care 12. Bedbound status at home: Supportive care 13. DVT/GI Prophylaxis: Lovenox 14. DNR and DNI 
  
Discussed with patient's 2 sisters Zamzam Min [phone] and also MsLaura Charlesemory Armendariz over the phone and explained about patient's current condition with very guarded prognosis. Explained about my above plan of care. Both of them understood and agreed with the plan. I also discussed with him about advanced directives, they were very clear they want DNR and DNI.   DNR form was filled out along with weakness from [facility] ER. 
  Addendum 1:50 AM 
Respiratory therapist called me with ABG results pH 7.10 and PCO2 113 Patient not a candidate for BiPAP given her obtunded state. Patient is also DNR. Discussed with pulmonary Dr. Ann Marie Posada, agree with no BiPAP but okay to use if patient's family understands the risk of aspiration. Discussed with the patient's POA Ms. Madhavi Mullen over the phone extensively about the ABG results and further plan including aggressive care with BiPAP including risk of aspiration versus starting on comfort measures. POA wanted to talk to her siblings and her dad and get back to me. Called CASTILLO Trinidad again, family made a decision to go with comfort measures. They do not want BiPAP, no aggressive measures, no tube feeds, no labs or further testing. They do not want no escalation of care but okay to continue antibiotics for now. Discussed with Dr. Ann Marie Posada, agree with the plan. Discussed with RN and also respiratory therapist. 
Comfort care orders placed. The patient passed away later into the afternoon on comfort measures with family at bedside The rest of the patient's chronic conditions were managed appropriately during their admission. They were medically stable at the time of discharge. Visit Vitals BP (!) 89/43 Pulse 96 Temp (!) 91.8 °F (33.2 °C) Resp 17 Ht 5' 4\" (1.626 m) Wt 127 kg (279 lb 15.8 oz) SpO2 95% BMI 48.06 kg/m² Labs Prior to Discharge: 
Labs: Results:  
   
Chemistry Recent Labs  
  03/15/21 
1700 *   
K 4.3  CO2 34* BUN 21* CREA 0.92  
CA 8.4* AGAP 6  
BUCR 23*   
TP 7.2 ALB 3.1*  
GLOB 4.1* AGRAT 0.8 CBC w/Diff Recent Labs  
  03/15/21 
1641 WBC 8.6  
RBC 5.76* HGB 16.3* HCT 54.5*  
 GRANS 72 LYMPH 19* EOS 0 Cardiac Enzymes Recent Labs  
  03/15/21 
1641 CPK 50 CKND1 2.6 Coagulation No results for input(s): PTP, INR, APTT, INREXT in the last 72 hours.    
Lipid Panel No results found for: CHOL, CHOLPOCT, CHOLX, CHLST, CHOLV, 840364, HDL, HDLP, LDL, LDLC, DLDLP, 645169, VLDLC, VLDL, TGLX, TRIGL, TRIGP, TGLPOCT, CHHD, CHHDX  
BNP No results for input(s): BNPP in the last 72 hours. Liver Enzymes Recent Labs  
  03/15/21 
1700 TP 7.2 ALB 3.1*  Thyroid Studies Lab Results Component Value Date/Time TSH 0.75 02/14/2018 03:47 PM  
    
 
 
Significant Imaging: 
Xr Shunt Series Result Date: 3/15/2021 EXAMINATION: AP shunt series INDICATION: Fever, the patient COMPARISON: None FINDINGS: Complete view the shunt series performed requiring 9 images. Evaluation of the  shunt is essentially nondiagnostic due to poor penetration partly related to body habitus, but also related to limited mobility. Correlation with same-day CT findings indicate ventriculoatrial shunt. Hazy pulmonary opacities with suspected pleural effusions. Nodular right suprahilar shadow corresponds with large azygous vein. Nonobstructive appearing bowel gas pattern. Exam is essentially nondiagnostic for  shunt. See additional details above. See same day chest CT. Ct Head Wo Cont Result Date: 3/15/2021 EXAMINATION: CT head without contrast INDICATION: Altered mental status,  shunt COMPARISON: CT 2/14/2018 TECHNIQUE: CT head without contrast with multiplanar reformations obtained. All CT scans at this facility are performed using dose optimization technique as appropriate to a performed exam, to include automated exposure control, adjustment of the mA and/or kV according to patient size (including appropriate matching first site specific examinations), or use of iterative reconstruction technique. FINDINGS: Motion artifact limits evaluation. No obvious focal mass effect or shift of midline structures. Left transplant frontal ventriculostomy catheter tip at roughly body of left lateral ventricle.  Right transparietal ventriculostomy catheter tip in region of foramen of Monro with suggestion of catheter disruption in the right parietal scalp. Ventricular posterior horn and slight prominent bilaterally, but slitlike ventricles anteriorly. No acute intracranial hemorrhage identified. No obvious territorial infarct identified. Small occipital craniotomy defect noted with low-lying cerebellar tonsils suggestive of Chiari II malformation. Imaged paranasal sinuses and mastoids appear well-aerated. No acute calvarial findings. Superficial soft tissues unremarkable. Left frontal and right parietal ventriculostomy catheters as described, similarly positioned compared to 2018 CT. -Ventricles appear slightly more narrowed overall compared to prior, characterized by slight prominence in the posterior horns and slitlike appearance of the anterior horn. Low cerebellar tonsils suggestive of Chiari II malformation. No obvious acute hemorrhage or focal mass effect. Motion artifact limits evaluation. Ct Chest Wo Cont Result Date: 3/15/2021 EXAMINATION: CT chest without contrast INDICATION: Altered mental status,  shunt COMPARISON: None TECHNIQUE: CT of the chest performed without contrast with multiplanar reformations. All CT scans at this facility are performed using dose optimization technique as appropriate to a performed exam, to include automated exposure control, adjustment of the mA and/or kV according to patient size (including appropriate matching first site specific examinations), or use of iterative reconstruction technique. FINDINGS: Evaluation limited by streak artifact from arms at side and body habitus. Evaluation of soft tissues and vessels also limited without vascular enhancement. Cardiovascular: Left neck catheter/line traveling along course of left jugular vein and left innominate vein, tip along the anterior margin of the SVC. A second catheter/neck at the base of neck near midline extending caudally eventually entering right atrium with tip at level of right ventricle.  Notable amount of collateral vessels in the upper mediastinum and right base of neck with large azygous vein, and additional extensive collateral vessels in the upper abdomen periaortic region. Heart size normal. Mediastinum: Imaged thyroid unremarkable. No definite adenopathy by size criteria. Small hiatal hernia. Esophagus nondistended. Pleura: Small bilateral pleural effusions. No definite pneumothorax. Lungs/airways: Motion limits evaluation. Bronchomalacia noted. Bilateral lower lobe extensive consolidation, likely partly due to compressive atelectasis. Patchy/mosaic groundglass opacities throughout the lungs and a few streaky densities. Upper abdomen: Slightly nodular appearance of the liver. Miscellaneous: Superficial soft tissues unremarkable. Bones: No obvious acute osseous findings. S-shaped thoracic scoliosis with dextrocurvature in the upper T-spine and levocurvature in the lower T-spine. Extensive bilateral lower lobe consolidation, likely partly due to atelectasis, but component of infiltrate also suspected. Query aspiration. -Small pleural effusions. -Patchy pulmonary groundglass and streaky densities as well, may represent edema or additional infiltrate. Bronchomalacia also noted. Probable stenosis/occlusion of some of the major veins in the upper mediastinum, not well delineated due to exam limitations described above, with extensive collaterals in the chest and upper abdomen as summarized above. -2 catheters/lines and in the neck may be within collapsed/occluded veins as described. Small hiatal hernia. Slightly nodular contour of the liver. Any concern for cirrhosis? See additional details above. Xr Chest Velinda Leyden Result Date: 3/15/2021 EXAMINATION: Chest single view INDICATION: Shortness of breath, hypoxia COMPARISON: 2/14/2018 FINDINGS: Single frontal view of the chest obtained. Low lung volumes and underpenetration limits evaluation.  Catheter/tubing projects on left neck and upper chest. Mildly enlarged cardiac silhouette. Prominent perihilar and beyond pulmonary interstitium. Possible layering left greater and right pleural effusions. No definite pneumothorax identified. Suspected interstitial infiltrate/edema and layering pleural effusions. Low lung volumes may exaggerate these findings. Suspected left-sided  shunt tubing, not well delineated Total time spent including time spent on discharge documentation and records reviewed: < 30 minutes Jameson Lee DO Internal Medicine, Hospitalist 
Pager: [phone] [facility]

## 2021-03-16 NOTE — PROGRESS NOTES
[facility] Update: 
 
 Ada Becerra is a 50 y.o. female with PMHx of hydrocephalus and occipital encephalocele with  shunts presents with difficulty breathing x3-4 days and cough. According to family she has had reduced appetite. According to family patient is able to answer some questions and has been at baseline mental status at home. Patient is bedbound at baseline. She presented to [facility] ER in respiratory distress, tachycardic, febrile, and hypotensive. She presented obtunded, hypoxic (O2 sats 70's-80's) , and mildly hypercapnic (PCO2 53.4). Patient placed non-rebreather and then on BiPAP, however, patient only mildly hypercarbic and obtunded, thus Bipap was removed. Additionally, she presents fluid overloaded and edematous. EKG Sinus tachycardia, 132, no ST changes. No electrolyte abnormalities. Lactic Acid 2.5. Septic bundle initiated. Started on broad spectrum abx. U/A positive for UTI. Concern for CAP/Aspiration PNA in addition to the above. Rapid COVID negative. CT head obtained.  shunt present. Neurosurgeon on call at [facility] consulted for possible admission in the event of infection with  shunt, but, upon reviewing imaging, states ventricles small, no s/o obstruction, and no indication for NS evaluation at this time. CT chest b/l lobe consolidation/atelectasis, small pleural effusions, and Patchy pulmonary groundglass and streaky densities, likely edema and/or infiltrates present. Troponin of 0.85 elevated proBNP, 4173. Given Lasix 40 mg IV x1. At the time of my evaluation, the patient is completely obtunded and likely aspirating on copious amounts of oral secretions. Repeat gas with Arterial Blood Gas result:  pO2 203; pCO2 113.5; pH 7.1;  HCO3 35.1, %O2 Sat 99. At this time she is NOT a candidate for Bipap due to her obtunded state, copious oral secretions, and inability to remove mask if necessary. Patient was transferred to [facility] to continue care.  She would likely suffer from rapid deterioration and we would cause more harm by placing her on Bipap, as she has likely already aspirated and will aspirate further if placed on Bipap. Would recommend intubation, however, patient's family (brother and sister patient's proxies) they want patient to be DNR/DNI. Due to the above, I recommend leaving the patient on HFNC. Patient would benefit best from comfort care measures Recommendations:  
 
Recommend comfort care measures if intubation is not wanted at this time. Cont DNR/DNI status and HFNC. Physical Exam 
Vitals signs and nursing note reviewed. Constitutional:   
   General: She is in acute distress. Obtunded Appearance: She is well-developed. She is obese. HENT:  
   Head: Normocephalic and atraumatic. Eyes:  
   General:     
   Right eye: No discharge. Left eye: No discharge. Neck: Musculoskeletal: Normal range of motion and neck supple. Cardiovascular:  
   Rate and Rhythm: Tachycardic Heart sounds: Normal heart sounds. No murmur. Pulmonary:  
   Effort: Tachypnea and respiratory distress present. Breath sounds: No stridor. Diffuse rhonchi. Examination of the right-upper field reveals severely decreased breath sounds. Examination of the left-upper field reveals decreased breath sounds. Examination of the right-middle field reveals decreased breath sounds. Examination of the left-middle field reveals decreased breath sounds. Examination of the right-lower field reveals decreased breath sounds. Examination of the left-lower field reveals decreased breath sounds. Decreased breath sounds present. Diffuse oral secretions Chest:  
   Chest wall: No tenderness. Abdominal:  
   General: hypoactive Bowel sounds. There is no distension. Palpations: Abdomen is soft. Tenderness: There is no abdominal tenderness. There is no guarding or rebound. Musculoskeletal: Normal range of motion. Right lower leg: Edema +3 pitting present.   
   Left lower leg: Edema +3 pitting present. Skin: 
   General: Skin is warm and dry. Cool to touch. Reynaldo Buck and Ricardo Hoover Neurological:  
   Mental Status: Obtunded. Unresponsive. Pulm: 
· Supplemental O2 via HFNC · titrate flow for goal SPO2> 90% · Aggressive Pulmonary hygiene care · Aspiration precautions · Keep HOB >30 degrees I/D:Sepsis bundle per hospital protocol ·  Blood, Sputum, and Urine cultures · Lactic acid ordered- initial and repeat Q4hrs till normalized ·  Antibiotics: Azithro, Cefepime, Vanco. Trend procal 
· Trend WBCs and temperature curve · Obtain Respiratory Viral Panel · Sputum cx · NPO  
· F/u chest imaging PRN  
· Diurese as needed · DVT and SUP prophylaxis per primary service CXR Results  (Last 48 hours) 03/15/21 1712  XR CHEST PORT Final result Impression: Suspected interstitial infiltrate/edema and layering pleural effusions. Low lung  
volumes may exaggerate these findings. Suspected left-sided  shunt tubing, not well delineated Narrative:  EXAMINATION: Chest single view INDICATION: Shortness of breath, hypoxia COMPARISON: 2/14/2018 FINDINGS: Single frontal view of the chest obtained. Low lung volumes and  
underpenetration limits evaluation. Catheter/tubing projects on left neck and  
upper chest. Mildly enlarged cardiac silhouette. Prominent perihilar and beyond  
pulmonary interstitium. Possible layering left greater and right pleural  
effusions. No definite pneumothorax identified. CT Results  (Last 48 hours) 03/15/21 2026  CT HEAD WO CONT Final result Impression:     
Left frontal and right parietal ventriculostomy catheters as described,  
similarly positioned compared to 2018 CT.  
-Ventricles appear slightly more narrowed overall compared to prior,  
characterized by slight prominence in the posterior horns and slitlike  
appearance of the anterior horn.   
   
Low cerebellar tonsils suggestive of Chiari II malformation. No obvious acute hemorrhage or focal mass effect. Motion artifact limits  
evaluation. Narrative:  EXAMINATION: CT head without contrast  
   
INDICATION: Altered mental status,  shunt COMPARISON: CT 2/14/2018 TECHNIQUE: CT head without contrast with multiplanar reformations obtained. All  
CT scans at this facility are performed using dose optimization technique as  
appropriate to a performed exam, to include automated exposure control,  
adjustment of the mA and/or kV according to patient size (including appropriate  
matching first site specific examinations), or use of iterative reconstruction  
technique. FINDINGS:  
   
Motion artifact limits evaluation. No obvious focal mass effect or shift of midline structures. Left transplant  
frontal ventriculostomy catheter tip at roughly body of left lateral ventricle. Right transparietal ventriculostomy catheter tip in region of foramen of Monro  
with suggestion of catheter disruption in the right parietal scalp. Ventricular  
posterior horn and slight prominent bilaterally, but slitlike ventricles  
anteriorly. No acute intracranial hemorrhage identified. No obvious territorial  
infarct identified. Small occipital craniotomy defect noted with low-lying  
cerebellar tonsils suggestive of Chiari II malformation. Imaged paranasal sinuses and mastoids appear well-aerated. No acute calvarial  
findings. Superficial soft tissues unremarkable. 03/15/21 2023  CT CHEST WO CONT Final result Impression:     
Extensive bilateral lower lobe consolidation, likely partly due to atelectasis,  
but component of infiltrate also suspected. Query aspiration.  
-Small pleural effusions.  
-Patchy pulmonary groundglass and streaky densities as well, may represent edema  
or additional infiltrate. Bronchomalacia also noted.   
   
Probable stenosis/occlusion of some of the major veins in the upper mediastinum,  
not well delineated due to exam limitations described above, with extensive  
collaterals in the chest and upper abdomen as summarized above.  
-2 catheters/lines and in the neck may be within collapsed/occluded veins as  
described. Small hiatal hernia. Slightly nodular contour of the liver. Any concern for  
cirrhosis? See additional details above. Narrative:  EXAMINATION: CT chest without contrast  
   
INDICATION: Altered mental status,  shunt COMPARISON: None TECHNIQUE: CT of the chest performed without contrast with multiplanar  
reformations. All CT scans at this facility are performed using dose  
optimization technique as appropriate to a performed exam, to include automated  
exposure control, adjustment of the mA and/or kV according to patient size  
(including appropriate matching first site specific examinations), or use of  
iterative reconstruction technique. FINDINGS:  
   
Evaluation limited by streak artifact from arms at side and body habitus. Evaluation of soft tissues and vessels also limited without vascular  
enhancement. Cardiovascular: Left neck catheter/line traveling along course of left jugular  
vein and left innominate vein, tip along the anterior margin of the SVC. A  
second catheter/neck at the base of neck near midline extending caudally  
eventually entering right atrium with tip at level of right ventricle. Notable  
amount of collateral vessels in the upper mediastinum and right base of neck  
with large azygous vein, and additional extensive collateral vessels in the  
upper abdomen periaortic region. Heart size normal.  
   
Mediastinum: Imaged thyroid unremarkable. No definite adenopathy by size  
criteria. Small hiatal hernia. Esophagus nondistended. Pleura: Small bilateral pleural effusions. No definite pneumothorax. Lungs/airways: Motion limits evaluation. Bronchomalacia noted.  Bilateral lower lobe extensive consolidation, likely partly due to compressive atelectasis. Patchy/mosaic groundglass opacities throughout the lungs and a few streaky  
densities. Upper abdomen: Slightly nodular appearance of the liver. Miscellaneous: Superficial soft tissues unremarkable. Bones: No obvious acute osseous findings. S-shaped thoracic scoliosis with  
dextrocurvature in the upper T-spine and levocurvature in the lower T-spine. High complexity decision making was performed during this consultation and evaluation. Critical care time exclusive of procedures spent managing the patient: 30 Minutes excluding procedures JAIME CruzBC 
03/16/21 Pulmonary, Critical Care Medicine [address] [place] [address] [facility]

## 2021-03-16 NOTE — PROGRESS NOTES
Problem: Dysphagia (Adult) Goal: *Acute Goals and Plan of Care (Insert Text) Description:  
 
Patient will: 1. Tolerate PO trials with 0 s/s overt distress in 4/5 trials Recommend: NPO with alternate means of nutrition/hydration vs comfort feeds per palliative Strict aspiration precautions (HOB >30 degrees at all times, Oral care TID) Outcome: Progressing Towards Goal 
  
SPEECH LANGUAGE PATHOLOGY BEDSIDE SWALLOW EVALUATION Patient: Rosetta Cloud (57 y.o. female) Date: 3/16/2021 Primary Diagnosis: CHF (congestive heart failure) ([facility] [address] [I50.9] Sepsis ([facility] [address] [A41.9] Precautions: aspriation PLOF: As per H&P 
 
ASSESSMENT : 
Based on the objective data described below, the patient presents with severe oropharyngeal dysphagia. Pt alert upon SLP arrival; unable to track SLP in room. Dry oral mucosa observed. Oral care provided by SLP with weak congested cough and watery eyes. She did not attempt to manipulation oral swab. Unable to elicit pharyngeal swallow to cold spoon lingual depression or palpation. Pt demo inadequate sensory/motor awareness/strength for safe and adequate nutritional intake. She appears to be at an extremely high risk of aspiration, malnutrition, and dehydration with PO at this time. Recommend NPO with consideration of an alternate means of nutrition/hydration vs comfort feeds, aspiration precautions, and oral care TID. Per chart review: possible palliative meeting today? ST will continue to follow. Patient will benefit from skilled intervention to address the above impairments. Patient's rehabilitation potential is considered to be Poor Factors which may influence rehabilitation potential include:  
[]            None noted [x]            Mental ability/status [x]            Medical condition []            Home/family situation and support systems [x]            Safety awareness 
[]            Pain tolerance/management []            Other: PLAN : Recommendations and Planned Interventions: See above Frequency/Duration: Patient will be followed by speech-language pathology 1-2 times per day/3-5 days per week to address goals. Discharge Recommendations: Paulo Elkins and To Be Determined SUBJECTIVE:  
Patient nonverbal 
 
OBJECTIVE:  
 
Past Medical History:  
Diagnosis Date Cerebral palsy ([address] Hematoma   
 left tibia Osteoarthritis of right knee Osteoporosis   
 diffuse Pressure ulcer of heel   
 right Scoliosis   
 thoracolumar Sprain   
 right lower leg and ankle Past Surgical History:  
Procedure Laterality Date HX ORTHOPAEDIC    
 leg surgery x 2 HX OTHER SURGICAL    
 shunts inserted in left and right side of head Prior Level of Function/Home Situation: unknown Diet prior to admission: unknown Current Diet:  NPO Cognitive and Communication Status: 
Neurologic State: Alert, Drowsy Orientation Level: Unable to verbalize Cognition: No command following Oral Assessment: 
Oral Assessment Labial: Decreased rate;Decreased seal 
Dentition: Limited;Natural 
Oral Hygiene: dry Lingual: Decreased rate;Decreased strength Velum: No impairment Mandible: No impairment P.O. Trials: 
Patient Position: 67 at [facility] Vocal quality prior to P.O.: Aphonic Consistency Presented: (oral care) How Presented: SLP-fed/presented Bolus Acceptance: Impaired Bolus Formation/Control: Impaired Type of Impairment: Anterior;Delayed;Poor;Posterior; Incomplete Propulsion: Absent Oral Residue: None Initiation of Swallow: Absent Laryngeal Elevation: Absent Aspiration Signs/Symptoms: Watery eyes;Strong cough Pharyngeal Phase Characteristics: Suspected pharyngeal residue;Poor endurance Effective Modifications: None Cues for Modifications: Maximal 
  
Oral Phase Severity: Severe Pharyngeal Phase Severity : Severe PAIN: 
Start of Eval: appeared to be in no pain/discomfort End of Eval: appeared to be in no pain/discomfort After treatment:  
[]            Patient left in no apparent distress sitting up in chair 
[x]            Patient left in no apparent distress in bed 
[x]            Call bell left within reach [x]            Nursing notified []            Family present 
[]            Caregiver present 
[]            Bed alarm activated COMMUNICATION/EDUCATION:  
[x]            Aspiration precautions; swallow safety; compensatory techniques. []            Patient/family have participated as able in goal setting and plan of care. []            Patient/family agree to work toward stated goals and plan of care. []            Patient understands intent and goals of therapy; neutral about participation. [x]            Patient unable to participate in goal setting/plan of care; educ ongoing with interdisciplinary staff 
[]         Posted safety precautions in patient's room. Thank you for this referral, Ashley Guevara Time Calculation: 10 mins

## 2021-03-16 NOTE — ED NOTES
The patient is admitted status and is in comfort measures and now starting to have seizures. I will give a dose of Ativan and update the inpatient team regarding the change in status. The patient will not have any further aggressive measures so we will give benzodiazepines to control the symptoms. Angel Roe, DO 1:28 PM 
 
I was called into the room as the patient had become asystolic and no longer breathing on her own and was pulseless. Time of death was called at 2:09 PM the patient's family was at the bedside.   I have updated Dr. Cherie Bravo the hospitalist.  Angel Roe, DO 2:11 PM

## 2021-03-16 NOTE — PROGRESS NOTES
Pt currently obtunded and on comfort measures. Will wait until palliative consult done to see what family plan is. Per notes, patient was bedbound and resided at home. Sonido Garcia RN - Outcomes Manager  [phone]

## 2021-03-16 NOTE — PROGRESS NOTES
This afternoon Pt respirations were declining and BP was declining> Ms Kristina Serrano (sister) was called to come sit with the patient and just after 2 Kristina Serrano was leaving. Before she was off the unit I called her back to the room after observing no cardiac activity on the monitor. Dr Kwadwo Carroll observed the same thing about the same time; AND Ms Kristina Serrano was able to come to her sisters bedside and sit with her as she passed. Pt was not breathing and there was no cardiac activity and the time of death; it  was called at 1409. Comfort measures this morning included suctioning  Her twice before her niece came, Dr Jael King suctioned her once while I was with another patient, and I suctioned her several more times, cleaned her face, eyes and chest with warm wipes. Spoke reassuring words to the patient and prayed with her as well. I provided emotional and spiritual support to ms Yodit Cintron her and we discussed heaven and how her family was in agreement that she was in heaven and at peace with no more pain and no more tears. [facility] was called and appropriate protocols were implemented.   
 
Jesús Gresham RN

## 2021-03-16 NOTE — ED NOTES
Patient's sister updated thru telephone call regarding current disposition. Condition unchanged at this time. Body indicators negative for pain or discomfort. All questions answered. Will continue to monitor patient's condition, including vital signs.

## 2021-03-17 NOTE — PROGRESS NOTES
responded to Death of  Betty Ríos, who was a 50 y.o.,female, The  provided the following Interventions: 
Contacted family members. Provided crisis pastoral care, pastoral support and grief interventions. Offered prayers on behalf of the patient at bedside. Chart reviewed. Plan: 
Chaplains will continue to follow and will provide pastoral care on an as needed/requested basis and grief support for the family. [address] [place] [address]. Spiritual Care  
[phone]

## 2021-03-21 LAB
BACTERIA SPEC CULT: NORMAL
BACTERIA SPEC CULT: NORMAL
SERVICE CMNT-IMP: NORMAL
SERVICE CMNT-IMP: NORMAL